# Patient Record
Sex: FEMALE | Race: WHITE | Employment: PART TIME | URBAN - METROPOLITAN AREA
[De-identification: names, ages, dates, MRNs, and addresses within clinical notes are randomized per-mention and may not be internally consistent; named-entity substitution may affect disease eponyms.]

---

## 2019-02-12 ENCOUNTER — HOSPITAL ENCOUNTER (EMERGENCY)
Age: 24
Discharge: HOME | End: 2019-02-12
Attending: EMERGENCY MEDICINE
Payer: COMMERCIAL

## 2019-02-12 VITALS
WEIGHT: 230 LBS | HEART RATE: 93 BPM | RESPIRATION RATE: 16 BRPM | TEMPERATURE: 98.7 F | OXYGEN SATURATION: 98 % | HEIGHT: 63 IN | BODY MASS INDEX: 40.75 KG/M2 | DIASTOLIC BLOOD PRESSURE: 88 MMHG | SYSTOLIC BLOOD PRESSURE: 152 MMHG

## 2019-02-12 DIAGNOSIS — J06.9 UPPER RESPIRATORY TRACT INFECTION, UNSPECIFIED TYPE: Primary | ICD-10-CM

## 2019-02-12 DIAGNOSIS — J01.80 ACUTE NON-RECURRENT SINUSITIS OF OTHER SINUS: ICD-10-CM

## 2019-02-12 PROCEDURE — 99282 EMERGENCY DEPT VISIT SF MDM: CPT

## 2019-02-12 RX ORDER — AMOXICILLIN 500 MG/1
500 CAPSULE ORAL 3 TIMES DAILY
Qty: 30 CAPSULE | Refills: 0 | Status: SHIPPED | OUTPATIENT
Start: 2019-02-12 | End: 2019-02-22

## 2019-02-12 RX ORDER — LORATADINE 10 MG/1
10 TABLET ORAL DAILY
Qty: 10 TABLET | Refills: 0 | Status: ON HOLD | OUTPATIENT
Start: 2019-02-12 | End: 2022-10-25 | Stop reason: HOSPADM

## 2019-02-12 RX ORDER — FLUTICASONE PROPIONATE 50 MCG
1 SPRAY, SUSPENSION (ML) NASAL DAILY
Qty: 1 BOTTLE | Refills: 0 | Status: SHIPPED | OUTPATIENT
Start: 2019-02-12 | End: 2020-02-07

## 2019-02-12 RX ORDER — HYDROCODONE POLISTIREX AND CHLORPHENIRAMINE POLISTIREX 10; 8 MG/5ML; MG/5ML
5 SUSPENSION, EXTENDED RELEASE ORAL EVERY 12 HOURS PRN
Qty: 50 ML | Refills: 0 | Status: SHIPPED | OUTPATIENT
Start: 2019-02-12 | End: 2019-02-15

## 2019-02-12 ASSESSMENT — PAIN DESCRIPTION - DESCRIPTORS: DESCRIPTORS: SORE

## 2019-02-12 ASSESSMENT — PAIN DESCRIPTION - LOCATION: LOCATION: THROAT

## 2019-02-12 ASSESSMENT — ENCOUNTER SYMPTOMS
EYES NEGATIVE: 1
ALLERGIC/IMMUNOLOGIC NEGATIVE: 1
SORE THROAT: 1
GASTROINTESTINAL NEGATIVE: 1
COUGH: 1

## 2019-02-12 ASSESSMENT — PAIN DESCRIPTION - ONSET: ONSET: ON-GOING

## 2019-02-12 ASSESSMENT — PAIN DESCRIPTION - PROGRESSION: CLINICAL_PROGRESSION: GRADUALLY IMPROVING

## 2019-02-12 ASSESSMENT — PAIN DESCRIPTION - PAIN TYPE: TYPE: ACUTE PAIN

## 2019-02-12 ASSESSMENT — PAIN SCALES - GENERAL: PAINLEVEL_OUTOF10: 1

## 2019-02-12 ASSESSMENT — PAIN - FUNCTIONAL ASSESSMENT: PAIN_FUNCTIONAL_ASSESSMENT: ACTIVITIES ARE NOT PREVENTED

## 2022-10-22 ENCOUNTER — APPOINTMENT (OUTPATIENT)
Dept: CT IMAGING | Age: 27
DRG: 263 | End: 2022-10-22
Payer: COMMERCIAL

## 2022-10-22 ENCOUNTER — HOSPITAL ENCOUNTER (INPATIENT)
Age: 27
LOS: 3 days | Discharge: HOME | DRG: 263 | End: 2022-10-25
Attending: STUDENT IN AN ORGANIZED HEALTH CARE EDUCATION/TRAINING PROGRAM | Admitting: SURGERY
Payer: COMMERCIAL

## 2022-10-22 DIAGNOSIS — K80.44 CHOLEDOCHOLITHIASIS WITH CHRONIC CHOLECYSTITIS: ICD-10-CM

## 2022-10-22 DIAGNOSIS — Z90.49 S/P LAPAROSCOPIC CHOLECYSTECTOMY: ICD-10-CM

## 2022-10-22 DIAGNOSIS — K80.21 CALCULUS OF GALLBLADDER WITH BILIARY OBSTRUCTION BUT WITHOUT CHOLECYSTITIS: Primary | ICD-10-CM

## 2022-10-22 DIAGNOSIS — R74.8 ELEVATED LIVER ENZYMES: ICD-10-CM

## 2022-10-22 DIAGNOSIS — G89.18 ACUTE POST-OPERATIVE PAIN: ICD-10-CM

## 2022-10-22 PROBLEM — K80.20 GALLSTONE (IMPACTED): Status: ACTIVE | Noted: 2022-10-22

## 2022-10-22 PROBLEM — K80.50 BILIARY COLIC: Status: ACTIVE | Noted: 2022-10-22

## 2022-10-22 LAB
ALBUMIN SERPL-MCNC: 3.8 G/DL (ref 3.5–4.6)
ALP BLD-CCNC: 454 U/L (ref 40–130)
ALT SERPL-CCNC: 565 U/L (ref 0–33)
ANION GAP SERPL CALCULATED.3IONS-SCNC: 12 MEQ/L (ref 9–15)
AST SERPL-CCNC: 601 U/L (ref 0–35)
BACTERIA: ABNORMAL /HPF
BASOPHILS ABSOLUTE: 0.1 K/UL (ref 0–0.2)
BASOPHILS RELATIVE PERCENT: 0.7 %
BILIRUB SERPL-MCNC: 1.7 MG/DL (ref 0.2–0.7)
BILIRUBIN URINE: ABNORMAL
BLOOD, URINE: NEGATIVE
BUN BLDV-MCNC: 10 MG/DL (ref 6–20)
C-REACTIVE PROTEIN: 12.6 MG/L (ref 0–5)
CALCIUM SERPL-MCNC: 9.1 MG/DL (ref 8.5–9.9)
CHLORIDE BLD-SCNC: 101 MEQ/L (ref 95–107)
CLARITY: CLEAR
CO2: 23 MEQ/L (ref 20–31)
COLOR: ABNORMAL
CREAT SERPL-MCNC: 0.53 MG/DL (ref 0.5–0.9)
EOSINOPHILS ABSOLUTE: 0.1 K/UL (ref 0–0.7)
EOSINOPHILS RELATIVE PERCENT: 0.6 %
EPITHELIAL CELLS, UA: ABNORMAL /HPF (ref 0–5)
GFR SERPL CREATININE-BSD FRML MDRD: >60 ML/MIN/{1.73_M2}
GLOBULIN: 3.3 G/DL (ref 2.3–3.5)
GLUCOSE BLD-MCNC: 105 MG/DL (ref 70–99)
GLUCOSE URINE: NEGATIVE MG/DL
HCG, URINE, POC: NEGATIVE
HCT VFR BLD CALC: 38.8 % (ref 37–47)
HEMOGLOBIN: 13.3 G/DL (ref 12–16)
HYALINE CASTS: ABNORMAL /HPF (ref 0–5)
KETONES, URINE: NEGATIVE MG/DL
LEUKOCYTE ESTERASE, URINE: ABNORMAL
LIPASE: 34 U/L (ref 12–95)
LYMPHOCYTES ABSOLUTE: 1.4 K/UL (ref 1–4.8)
LYMPHOCYTES RELATIVE PERCENT: 13.2 %
Lab: NORMAL
MCH RBC QN AUTO: 27.6 PG (ref 27–31.3)
MCHC RBC AUTO-ENTMCNC: 34.4 % (ref 33–37)
MCV RBC AUTO: 80.4 FL (ref 79.4–94.8)
MONOCYTES ABSOLUTE: 0.6 K/UL (ref 0.2–0.8)
MONOCYTES RELATIVE PERCENT: 6.2 %
NEGATIVE QC PASS/FAIL: NORMAL
NEUTROPHILS ABSOLUTE: 8.3 K/UL (ref 1.4–6.5)
NEUTROPHILS RELATIVE PERCENT: 79.3 %
NITRITE, URINE: NEGATIVE
PDW BLD-RTO: 15.2 % (ref 11.5–14.5)
PH UA: 5.5 (ref 5–9)
PLATELET # BLD: 306 K/UL (ref 130–400)
POSITIVE QC PASS/FAIL: NORMAL
POTASSIUM SERPL-SCNC: 3.8 MEQ/L (ref 3.4–4.9)
PROTEIN UA: 100 MG/DL
RBC # BLD: 4.83 M/UL (ref 4.2–5.4)
RBC UA: ABNORMAL /HPF (ref 0–5)
REASON FOR REJECTION: NORMAL
REJECTED TEST: NORMAL
SODIUM BLD-SCNC: 136 MEQ/L (ref 135–144)
SPECIFIC GRAVITY UA: 1.02 (ref 1–1.03)
TOTAL PROTEIN: 7.1 G/DL (ref 6.3–8)
URINE REFLEX TO CULTURE: YES
UROBILINOGEN, URINE: 0.2 E.U./DL
WBC # BLD: 10.5 K/UL (ref 4.8–10.8)
WBC UA: ABNORMAL /HPF (ref 0–5)

## 2022-10-22 PROCEDURE — 74176 CT ABD & PELVIS W/O CONTRAST: CPT

## 2022-10-22 PROCEDURE — 1210000000 HC MED SURG R&B

## 2022-10-22 PROCEDURE — 6360000002 HC RX W HCPCS: Performed by: STUDENT IN AN ORGANIZED HEALTH CARE EDUCATION/TRAINING PROGRAM

## 2022-10-22 PROCEDURE — 99285 EMERGENCY DEPT VISIT HI MDM: CPT

## 2022-10-22 PROCEDURE — 6370000000 HC RX 637 (ALT 250 FOR IP): Performed by: SURGERY

## 2022-10-22 PROCEDURE — 87086 URINE CULTURE/COLONY COUNT: CPT

## 2022-10-22 PROCEDURE — 80053 COMPREHEN METABOLIC PANEL: CPT

## 2022-10-22 PROCEDURE — 81001 URINALYSIS AUTO W/SCOPE: CPT

## 2022-10-22 PROCEDURE — 99223 1ST HOSP IP/OBS HIGH 75: CPT | Performed by: SURGERY

## 2022-10-22 PROCEDURE — 85025 COMPLETE CBC W/AUTO DIFF WBC: CPT

## 2022-10-22 PROCEDURE — 2580000003 HC RX 258: Performed by: SURGERY

## 2022-10-22 PROCEDURE — 86140 C-REACTIVE PROTEIN: CPT

## 2022-10-22 PROCEDURE — 96375 TX/PRO/DX INJ NEW DRUG ADDON: CPT

## 2022-10-22 PROCEDURE — 96365 THER/PROPH/DIAG IV INF INIT: CPT

## 2022-10-22 PROCEDURE — 36415 COLL VENOUS BLD VENIPUNCTURE: CPT

## 2022-10-22 PROCEDURE — 83690 ASSAY OF LIPASE: CPT

## 2022-10-22 PROCEDURE — 2580000003 HC RX 258: Performed by: STUDENT IN AN ORGANIZED HEALTH CARE EDUCATION/TRAINING PROGRAM

## 2022-10-22 RX ORDER — ONDANSETRON 2 MG/ML
4 INJECTION INTRAMUSCULAR; INTRAVENOUS
Status: COMPLETED | OUTPATIENT
Start: 2022-10-22 | End: 2022-10-22

## 2022-10-22 RX ORDER — POLYETHYLENE GLYCOL 3350 17 G/17G
17 POWDER, FOR SOLUTION ORAL DAILY
Status: DISCONTINUED | OUTPATIENT
Start: 2022-10-23 | End: 2022-10-23

## 2022-10-22 RX ORDER — ACETAMINOPHEN 325 MG/1
650 TABLET ORAL EVERY 4 HOURS PRN
Status: DISCONTINUED | OUTPATIENT
Start: 2022-10-22 | End: 2022-10-24

## 2022-10-22 RX ORDER — SODIUM CHLORIDE 9 MG/ML
INJECTION, SOLUTION INTRAVENOUS PRN
Status: DISCONTINUED | OUTPATIENT
Start: 2022-10-22 | End: 2022-10-22 | Stop reason: SDUPTHER

## 2022-10-22 RX ORDER — BISACODYL 10 MG
10 SUPPOSITORY, RECTAL RECTAL DAILY PRN
Status: DISCONTINUED | OUTPATIENT
Start: 2022-10-22 | End: 2022-10-25 | Stop reason: HOSPADM

## 2022-10-22 RX ORDER — ENOXAPARIN SODIUM 100 MG/ML
30 INJECTION SUBCUTANEOUS 2 TIMES DAILY
Status: DISCONTINUED | OUTPATIENT
Start: 2022-10-22 | End: 2022-10-24

## 2022-10-22 RX ORDER — ONDANSETRON 2 MG/ML
4 INJECTION INTRAMUSCULAR; INTRAVENOUS EVERY 6 HOURS PRN
Status: DISCONTINUED | OUTPATIENT
Start: 2022-10-22 | End: 2022-10-25 | Stop reason: HOSPADM

## 2022-10-22 RX ORDER — SODIUM CHLORIDE, SODIUM LACTATE, POTASSIUM CHLORIDE, CALCIUM CHLORIDE 600; 310; 30; 20 MG/100ML; MG/100ML; MG/100ML; MG/100ML
INJECTION, SOLUTION INTRAVENOUS CONTINUOUS
Status: DISCONTINUED | OUTPATIENT
Start: 2022-10-22 | End: 2022-10-23

## 2022-10-22 RX ORDER — SODIUM CHLORIDE 0.9 % (FLUSH) 0.9 %
5-40 SYRINGE (ML) INJECTION PRN
Status: DISCONTINUED | OUTPATIENT
Start: 2022-10-22 | End: 2022-10-22 | Stop reason: SDUPTHER

## 2022-10-22 RX ORDER — SODIUM CHLORIDE 0.9 % (FLUSH) 0.9 %
5-40 SYRINGE (ML) INJECTION EVERY 12 HOURS SCHEDULED
Status: DISCONTINUED | OUTPATIENT
Start: 2022-10-22 | End: 2022-10-25 | Stop reason: HOSPADM

## 2022-10-22 RX ORDER — FENTANYL CITRATE 50 UG/ML
25 INJECTION, SOLUTION INTRAMUSCULAR; INTRAVENOUS
Status: DISCONTINUED | OUTPATIENT
Start: 2022-10-22 | End: 2022-10-23

## 2022-10-22 RX ORDER — SODIUM CHLORIDE 9 MG/ML
INJECTION, SOLUTION INTRAVENOUS PRN
Status: DISCONTINUED | OUTPATIENT
Start: 2022-10-22 | End: 2022-10-25 | Stop reason: HOSPADM

## 2022-10-22 RX ORDER — ONDANSETRON 4 MG/1
4 TABLET, ORALLY DISINTEGRATING ORAL EVERY 8 HOURS PRN
Status: DISCONTINUED | OUTPATIENT
Start: 2022-10-22 | End: 2022-10-25 | Stop reason: HOSPADM

## 2022-10-22 RX ORDER — SODIUM CHLORIDE 0.9 % (FLUSH) 0.9 %
5-40 SYRINGE (ML) INJECTION EVERY 12 HOURS SCHEDULED
Status: DISCONTINUED | OUTPATIENT
Start: 2022-10-22 | End: 2022-10-22 | Stop reason: SDUPTHER

## 2022-10-22 RX ORDER — KETOROLAC TROMETHAMINE 30 MG/ML
30 INJECTION, SOLUTION INTRAMUSCULAR; INTRAVENOUS ONCE
Status: COMPLETED | OUTPATIENT
Start: 2022-10-22 | End: 2022-10-22

## 2022-10-22 RX ORDER — SODIUM CHLORIDE 0.9 % (FLUSH) 0.9 %
5-40 SYRINGE (ML) INJECTION PRN
Status: DISCONTINUED | OUTPATIENT
Start: 2022-10-22 | End: 2022-10-25 | Stop reason: HOSPADM

## 2022-10-22 RX ORDER — FENTANYL CITRATE 50 UG/ML
50 INJECTION, SOLUTION INTRAMUSCULAR; INTRAVENOUS
Status: DISCONTINUED | OUTPATIENT
Start: 2022-10-22 | End: 2022-10-23

## 2022-10-22 RX ADMIN — KETOROLAC TROMETHAMINE 30 MG: 30 INJECTION, SOLUTION INTRAMUSCULAR at 14:50

## 2022-10-22 RX ADMIN — ACETAMINOPHEN 650 MG: 325 TABLET ORAL at 20:43

## 2022-10-22 RX ADMIN — ONDANSETRON 4 MG: 2 INJECTION INTRAMUSCULAR; INTRAVENOUS at 14:50

## 2022-10-22 RX ADMIN — SODIUM CHLORIDE, PRESERVATIVE FREE 10 ML: 5 INJECTION INTRAVENOUS at 20:44

## 2022-10-22 RX ADMIN — PIPERACILLIN AND TAZOBACTAM 3375 MG: 3; .375 INJECTION, POWDER, FOR SOLUTION INTRAVENOUS at 17:36

## 2022-10-22 ASSESSMENT — ENCOUNTER SYMPTOMS
COUGH: 0
TROUBLE SWALLOWING: 0
DIARRHEA: 0
SINUS PRESSURE: 0
SHORTNESS OF BREATH: 0
ABDOMINAL PAIN: 1
VOMITING: 0
BACK PAIN: 0
NAUSEA: 1
CHEST TIGHTNESS: 0

## 2022-10-22 ASSESSMENT — PAIN DESCRIPTION - LOCATION
LOCATION: ABDOMEN
LOCATION: HEAD
LOCATION: ABDOMEN
LOCATION: ABDOMEN

## 2022-10-22 ASSESSMENT — PAIN DESCRIPTION - ORIENTATION
ORIENTATION: RIGHT;UPPER

## 2022-10-22 ASSESSMENT — PAIN DESCRIPTION - DESCRIPTORS
DESCRIPTORS: SQUEEZING
DESCRIPTORS: ACHING

## 2022-10-22 ASSESSMENT — PAIN SCALES - GENERAL
PAINLEVEL_OUTOF10: 5
PAINLEVEL_OUTOF10: 7
PAINLEVEL_OUTOF10: 2
PAINLEVEL_OUTOF10: 6

## 2022-10-22 ASSESSMENT — PAIN DESCRIPTION - PAIN TYPE: TYPE: ACUTE PAIN

## 2022-10-22 ASSESSMENT — PAIN DESCRIPTION - FREQUENCY: FREQUENCY: INTERMITTENT

## 2022-10-22 ASSESSMENT — PAIN - FUNCTIONAL ASSESSMENT: PAIN_FUNCTIONAL_ASSESSMENT: 0-10

## 2022-10-22 NOTE — ED PROVIDER NOTES
[facility] [facility] ED  eMERGENCY dEPARTMENT eNCOUnter      Pt Name: Aminah Graham  MRN: [id number]  [place] 1995  Date of evaluation: 10/22/2022  Provider: Nini Cardenas       Chief Complaint   Patient presents with    Abdominal Pain     Abd pain x 3 days pt has a hx gallstone. Pain is in the upper right quadrant. HISTORY OF PRESENT ILLNESS   (Location/Symptom, Timing/Onset,Context/Setting, Quality, Duration, Modifying Factors, Severity)  Note limiting factors. Aminah Graham is a 32 y.o. female who presents to the emergency department with 3 days of epigastric and right upper quadrant abdominal pain. History of gallstones in the past.  Patient had eaten some cookies,\"thin mints\" prior to arrival.  Patient has nausea but no vomiting. No diarrhea. Eating and drinking makes the pain worse. Patient states she did eat much yesterday because of the pain. Patient denies any fever, chills, cough. Patient states pain is sharp in quality. Patient denies any modifying factors making the symptoms better. The history is provided by the patient and the spouse. NursingNotes were reviewed. REVIEW OF SYSTEMS    (2-9 systems for level 4, 10 or more for level 5)     Review of Systems   Constitutional:  Negative for activity change, appetite change, chills, fever and unexpected weight change. HENT:  Negative for drooling, ear pain, nosebleeds, sinus pressure and trouble swallowing. Respiratory:  Negative for cough, chest tightness and shortness of breath. Cardiovascular:  Negative for chest pain and leg swelling. Gastrointestinal:  Positive for abdominal pain and nausea. Negative for diarrhea and vomiting. Endocrine: Negative for polydipsia and polyphagia. Genitourinary:  Negative for dysuria, flank pain and frequency. Musculoskeletal:  Negative for back pain and myalgias. Skin:  Negative for pallor and rash.    Neurological:  Negative for syncope, weakness and headaches. Hematological:  Does not bruise/bleed easily. All other systems reviewed and are negative. Except as noted above the remainder of the review of systems was reviewed and negative. PAST MEDICAL HISTORY   No past medical history on file. SURGICALHISTORY     No past surgical history on file. CURRENT MEDICATIONS       Previous Medications    FLUTICASONE (FLONASE) 50 MCG/ACT NASAL SPRAY    1 spray by Nasal route daily    LORATADINE (CLARITIN) 10 MG TABLET    Take 1 tablet by mouth daily       ALLERGIES     Patient has no known allergies. FAMILY HISTORY     No family history on file. SOCIAL HISTORY       Social History     Socioeconomic History    Marital status: Single   Tobacco Use    Smoking status: Passive Smoke Exposure - Never Smoker    Smokeless tobacco: Never   Substance and Sexual Activity    Alcohol use: No    Drug use: No    Sexual activity: Yes     Partners: Male       SCREENINGS    [place] Coma Scale  Eye Opening: Spontaneous  Best Verbal Response: Oriented  Best Motor Response: Obeys commands  Jasmin Coma Scale Score: 15 @FLOW([id number])@      PHYSICAL EXAM    (up to 7 for level 4, 8 or more for level 5)     ED Triage Vitals [10/22/22 1351]   BP Temp Temp Source Heart Rate Resp SpO2 Height Weight   (!) 145/89 98.1 °F (36.7 °C) Oral 99 18 99 % 5' 3\" (1.6 m) 220 lb (99.8 kg)       Physical Exam  Vitals and nursing note reviewed. Constitutional:       General: She is awake. She is in acute distress. Appearance: Normal appearance. She is well-developed. She is obese. She is not ill-appearing, toxic-appearing or diaphoretic. Comments: No photophobia. No phonophobia. HENT:      Head: Normocephalic and atraumatic. No Maxwell's sign. Right Ear: External ear normal.      Left Ear: External ear normal.      Nose: Nose normal. No congestion or rhinorrhea.       Mouth/Throat:      Mouth: Mucous membranes are moist.      Pharynx: Oropharynx is clear. No oropharyngeal exudate or posterior oropharyngeal erythema. Eyes:      General: No scleral icterus. Right eye: No foreign body or discharge. Left eye: No discharge. Extraocular Movements: Extraocular movements intact. Conjunctiva/sclera: Conjunctivae normal.      Left eye: No exudate. Pupils: Pupils are equal, round, and reactive to light. Neck:      Vascular: No JVD. Trachea: No tracheal deviation. Comments: No meningismus. Cardiovascular:      Rate and Rhythm: Normal rate and regular rhythm. Pulses: Normal pulses. Heart sounds: Normal heart sounds. Heart sounds not distant. No murmur heard. No friction rub. No gallop. Pulmonary:      Effort: Pulmonary effort is normal. No respiratory distress. Breath sounds: Normal breath sounds. No stridor. No wheezing, rhonchi or rales. Chest:      Chest wall: No tenderness. Abdominal:      General: Abdomen is flat. Bowel sounds are normal. There is no distension or abdominal bruit. There are no signs of injury. Palpations: Abdomen is soft. There is no shifting dullness, fluid wave, hepatomegaly, splenomegaly, mass or pulsatile mass. Tenderness: There is abdominal tenderness in the right upper quadrant and epigastric area. There is guarding. There is no right CVA tenderness, left CVA tenderness or rebound. Positive signs include Hill's sign. Negative signs include Rovsing's sign and McBurney's sign. Hernia: No hernia is present. Musculoskeletal:         General: No swelling, tenderness, deformity or signs of injury. Normal range of motion. Cervical back: Normal range of motion and neck supple. No rigidity. Lymphadenopathy:      Head:      Right side of head: No submental adenopathy. Left side of head: No submental adenopathy. Skin:     General: Skin is warm and dry. Capillary Refill: Capillary refill takes less than 2 seconds.       Coloration: Skin is not jaundiced or pale. Findings: No bruising, erythema, lesion or rash. Neurological:      General: No focal deficit present. Mental Status: She is alert and oriented to person, place, and time. Mental status is at baseline. Cranial Nerves: No cranial nerve deficit. Sensory: No sensory deficit. Motor: No weakness. Coordination: Coordination normal.      Deep Tendon Reflexes: Reflexes are normal and symmetric. Psychiatric:         Mood and Affect: Mood normal.         Behavior: Behavior normal. Behavior is cooperative. Thought Content: Thought content normal.         Judgment: Judgment normal.       DIAGNOSTIC RESULTS     EKG: All EKG's are interpreted by the Emergency Department Physician who either signs or Co-signsthis chart in the absence of a cardiologist.        RADIOLOGY:   Adeola Heading such as CT, Ultrasound and MRI are read by the radiologist. Plain radiographic images are visualized and preliminarily interpreted by the emergency physician with the below findings:        Interpretation per the Radiologist below, if available at the time ofthis note:    CT ABDOMEN PELVIS WO CONTRAST Additional Contrast? None   Final Result   Distended gallbladder with layering stones in the fundus but no evidence of   significant thickening of the gallbladder wall, no evidence of   pericholecystic stranding, no secondary signs of acute cholecystitis. Would   recommend clinical correlation. Scattered peritoneal lymph nodes visualized. The appendix is visualized and is unremarkable. No evidence of renal stones or obstructive uropathy.                ED BEDSIDE ULTRASOUND:   Performed by ED Physician - none    LABS:  Labs Reviewed   CBC WITH AUTO DIFFERENTIAL - Abnormal; Notable for the following components:       Result Value    RDW 15.2 (*)     Neutrophils Absolute 8.3 (*)     All other components within normal limits   C-REACTIVE PROTEIN - Abnormal; Notable for the following components:    CRP 12.6 (*)     All other components within normal limits   URINALYSIS WITH REFLEX TO CULTURE - Abnormal; Notable for the following components:    Color, UA DARK YELLOW (*)     Bilirubin Urine SMALL (*)     Protein,  (*)     Leukocyte Esterase, Urine SMALL (*)     All other components within normal limits   MICROSCOPIC URINALYSIS - Abnormal; Notable for the following components:    Bacteria, UA RARE (*)     WBC, UA 10-20 (*)     RBC, UA 3-5 (*)     All other components within normal limits   COMPREHENSIVE METABOLIC PANEL - Abnormal; Notable for the following components:    Glucose 105 (*)     Total Bilirubin 1.7 (*)     Alkaline Phosphatase 454 (*)      (*)      (*)     All other components within normal limits   CULTURE, URINE   SPECIMEN REJECTION   LIPASE   POC PREGNANCY UR-QUAL       All other labs were within normal range or not returned as of this dictation. EMERGENCY DEPARTMENT COURSE and DIFFERENTIAL DIAGNOSIS/MDM:   Vitals:    Vitals:    10/22/22 1351 10/22/22 1630 10/22/22 1700   BP: (!) 145/89 113/76 108/72   Pulse: 99 98 96   Resp: 18 18 16   Temp: 98.1 °F (36.7 °C)     TempSrc: Oral     SpO2: 99% 100% 100%   Weight: 220 lb (99.8 kg)     Height: 5' 3\" (1.6 m)             MDM  Patient has elevated liver enzymes, elevated bilirubin, elevated alk phos, elevated AST and ALT. Surgery consult was obtained with Dr. Adi Lloyd every 6 hours and ED attending to place transitional orders. On reexamination the findings were discussed with the patient. ED attending explained the plan is for surgery tomorrow. Patient n.p.o.            CONSULTS:  None    PROCEDURES:  Unless otherwise noted below, none     Procedures    FINAL IMPRESSION      1. Calculus of gallbladder with biliary obstruction but without cholecystitis    2.  Elevated liver enzymes          DISPOSITION/PLAN   DISPOSITION Decision To Admit 10/22/2022 04:52:41 PM      PATIENT REFERRED TO:  No follow-up provider specified.     DISCHARGE MEDICATIONS:  New Prescriptions    No medications on file          (Please note that portions of this note were completed with a voice recognition program.  Efforts were made to edit the dictations but occasionally words are mis-transcribed.)    Stoney Puente DO (electronically signed)  Attending Emergency Physician          Stoney Puente DO  10/22/22 [facility],   10/22/22 [id number]

## 2022-10-23 ENCOUNTER — ANESTHESIA (OUTPATIENT)
Dept: OPERATING ROOM | Age: 27
DRG: 263 | End: 2022-10-23
Payer: COMMERCIAL

## 2022-10-23 PROBLEM — K80.44 CHOLEDOCHOLITHIASIS WITH CHRONIC CHOLECYSTITIS: Status: ACTIVE | Noted: 2022-10-22

## 2022-10-23 LAB
ABO/RH: NORMAL
ALBUMIN SERPL-MCNC: 3.7 G/DL (ref 3.5–4.6)
ALP BLD-CCNC: 462 U/L (ref 40–130)
ALT SERPL-CCNC: 406 U/L (ref 0–33)
ANION GAP SERPL CALCULATED.3IONS-SCNC: 11 MEQ/L (ref 9–15)
ANTIBODY SCREEN: NORMAL
AST SERPL-CCNC: 257 U/L (ref 0–35)
BASOPHILS ABSOLUTE: 0.1 K/UL (ref 0–0.2)
BASOPHILS RELATIVE PERCENT: 0.5 %
BILIRUB SERPL-MCNC: 0.8 MG/DL (ref 0.2–0.7)
BILIRUBIN DIRECT: 0.3 MG/DL (ref 0–0.4)
BUN BLDV-MCNC: 9 MG/DL (ref 6–20)
CALCIUM SERPL-MCNC: 8.5 MG/DL (ref 8.5–9.9)
CHLORIDE BLD-SCNC: 101 MEQ/L (ref 95–107)
CO2: 25 MEQ/L (ref 20–31)
CREAT SERPL-MCNC: 0.62 MG/DL (ref 0.5–0.9)
EOSINOPHILS ABSOLUTE: 0 K/UL (ref 0–0.7)
EOSINOPHILS RELATIVE PERCENT: 0.3 %
GFR SERPL CREATININE-BSD FRML MDRD: >60 ML/MIN/{1.73_M2}
GLOBULIN: 2.8 G/DL (ref 2.3–3.5)
GLUCOSE BLD-MCNC: 84 MG/DL (ref 70–99)
HAV IGM SER IA-ACNC: NONREACTIVE
HCT VFR BLD CALC: 35.9 % (ref 37–47)
HEMOGLOBIN: 11.9 G/DL (ref 12–16)
HEPATITIS B CORE IGM ANTIBODY: NONREACTIVE
HEPATITIS B SURFACE ANTIGEN: NONREACTIVE
HEPATITIS C ANTIBODY: NONREACTIVE
INR BLD: 1.1
LYMPHOCYTES ABSOLUTE: 1.8 K/UL (ref 1–4.8)
LYMPHOCYTES RELATIVE PERCENT: 15.2 %
MCH RBC QN AUTO: 26.6 PG (ref 27–31.3)
MCHC RBC AUTO-ENTMCNC: 33.1 % (ref 33–37)
MCV RBC AUTO: 80.4 FL (ref 79.4–94.8)
MONOCYTES ABSOLUTE: 0.8 K/UL (ref 0.2–0.8)
MONOCYTES RELATIVE PERCENT: 6.8 %
NEUTROPHILS ABSOLUTE: 9.3 K/UL (ref 1.4–6.5)
NEUTROPHILS RELATIVE PERCENT: 77.2 %
PDW BLD-RTO: 15.3 % (ref 11.5–14.5)
PLATELET # BLD: 298 K/UL (ref 130–400)
POTASSIUM REFLEX MAGNESIUM: 3.6 MEQ/L (ref 3.4–4.9)
PROTHROMBIN TIME: 14.1 SEC (ref 12.3–14.9)
RBC # BLD: 4.46 M/UL (ref 4.2–5.4)
SODIUM BLD-SCNC: 137 MEQ/L (ref 135–144)
TOTAL PROTEIN: 6.5 G/DL (ref 6.3–8)
URINE CULTURE, ROUTINE: NORMAL
WBC # BLD: 12.1 K/UL (ref 4.8–10.8)

## 2022-10-23 PROCEDURE — 2580000003 HC RX 258: Performed by: SURGERY

## 2022-10-23 PROCEDURE — 99232 SBSQ HOSP IP/OBS MODERATE 35: CPT | Performed by: PHYSICIAN ASSISTANT

## 2022-10-23 PROCEDURE — 1210000000 HC MED SURG R&B

## 2022-10-23 PROCEDURE — 86900 BLOOD TYPING SEROLOGIC ABO: CPT

## 2022-10-23 PROCEDURE — 85025 COMPLETE CBC W/AUTO DIFF WBC: CPT

## 2022-10-23 PROCEDURE — 6360000002 HC RX W HCPCS: Performed by: SURGERY

## 2022-10-23 PROCEDURE — 82248 BILIRUBIN DIRECT: CPT

## 2022-10-23 PROCEDURE — 86901 BLOOD TYPING SEROLOGIC RH(D): CPT

## 2022-10-23 PROCEDURE — 80074 ACUTE HEPATITIS PANEL: CPT

## 2022-10-23 PROCEDURE — 85610 PROTHROMBIN TIME: CPT

## 2022-10-23 PROCEDURE — 80053 COMPREHEN METABOLIC PANEL: CPT

## 2022-10-23 PROCEDURE — 36415 COLL VENOUS BLD VENIPUNCTURE: CPT

## 2022-10-23 PROCEDURE — 86850 RBC ANTIBODY SCREEN: CPT

## 2022-10-23 PROCEDURE — 2580000003 HC RX 258: Performed by: PHYSICIAN ASSISTANT

## 2022-10-23 PROCEDURE — 6370000000 HC RX 637 (ALT 250 FOR IP): Performed by: SURGERY

## 2022-10-23 RX ORDER — SODIUM CHLORIDE, SODIUM LACTATE, POTASSIUM CHLORIDE, CALCIUM CHLORIDE 600; 310; 30; 20 MG/100ML; MG/100ML; MG/100ML; MG/100ML
INJECTION, SOLUTION INTRAVENOUS CONTINUOUS
Status: DISCONTINUED | OUTPATIENT
Start: 2022-10-24 | End: 2022-10-24

## 2022-10-23 RX ORDER — SENNA AND DOCUSATE SODIUM 50; 8.6 MG/1; MG/1
2 TABLET, FILM COATED ORAL DAILY PRN
Status: DISCONTINUED | OUTPATIENT
Start: 2022-10-23 | End: 2022-10-24

## 2022-10-23 RX ADMIN — PIPERACILLIN AND TAZOBACTAM 3375 MG: 3; .375 INJECTION, POWDER, FOR SOLUTION INTRAVENOUS at 08:12

## 2022-10-23 RX ADMIN — ACETAMINOPHEN 650 MG: 325 TABLET ORAL at 12:09

## 2022-10-23 RX ADMIN — PIPERACILLIN AND TAZOBACTAM 3375 MG: 3; .375 INJECTION, POWDER, FOR SOLUTION INTRAVENOUS at 16:22

## 2022-10-23 RX ADMIN — HYDROMORPHONE HYDROCHLORIDE 0.5 MG: 1 INJECTION, SOLUTION INTRAMUSCULAR; INTRAVENOUS; SUBCUTANEOUS at 01:46

## 2022-10-23 RX ADMIN — PIPERACILLIN AND TAZOBACTAM 3375 MG: 3; .375 INJECTION, POWDER, FOR SOLUTION INTRAVENOUS at 01:49

## 2022-10-23 RX ADMIN — Medication 10 ML: at 23:06

## 2022-10-23 RX ADMIN — SODIUM CHLORIDE, POTASSIUM CHLORIDE, SODIUM LACTATE AND CALCIUM CHLORIDE: 600; 310; 30; 20 INJECTION, SOLUTION INTRAVENOUS at 23:37

## 2022-10-23 RX ADMIN — ENOXAPARIN SODIUM 30 MG: 100 INJECTION SUBCUTANEOUS at 08:09

## 2022-10-23 RX ADMIN — Medication 10 ML: at 01:51

## 2022-10-23 RX ADMIN — SODIUM CHLORIDE, POTASSIUM CHLORIDE, SODIUM LACTATE AND CALCIUM CHLORIDE: 600; 310; 30; 20 INJECTION, SOLUTION INTRAVENOUS at 02:24

## 2022-10-23 RX ADMIN — PIPERACILLIN AND TAZOBACTAM 3375 MG: 3; .375 INJECTION, POWDER, FOR SOLUTION INTRAVENOUS at 23:04

## 2022-10-23 ASSESSMENT — PAIN SCALES - GENERAL
PAINLEVEL_OUTOF10: 7
PAINLEVEL_OUTOF10: 3
PAINLEVEL_OUTOF10: 0

## 2022-10-23 ASSESSMENT — ENCOUNTER SYMPTOMS
ALLERGIC/IMMUNOLOGIC NEGATIVE: 1
RESPIRATORY NEGATIVE: 1
EYES NEGATIVE: 1
ABDOMINAL PAIN: 1
NAUSEA: 1

## 2022-10-23 ASSESSMENT — PAIN DESCRIPTION - LOCATION: LOCATION: ABDOMEN

## 2022-10-23 NOTE — PLAN OF CARE
Problem: Discharge Planning  Goal: Discharge to home or other facility with appropriate resources  Outcome: Progressing  Flowsheets (Taken 10/22/2022 1953)  Discharge to home or other facility with appropriate resources:   Identify barriers to discharge with patient and caregiver   Identify discharge learning needs (meds, wound care, etc)     Problem: Pain  Goal: Verbalizes/displays adequate comfort level or baseline comfort level  Outcome: Progressing

## 2022-10-23 NOTE — PROGRESS NOTES
EMERGENCY GENERAL SURGERY DAILY PROGRESS NOTE      Patient Name: Karey Souza  Admission Date 10/22/2022    Hospital Day: 1  Patient seen and examined on 10/23/2022    INTERVAL HISTORY/EVENTS     Background:  Karey Souza is a 32 y.o. female without significant PMHx. Recent pregnancy with vaginal delivery 4 months ago. No prior abdominal surgeries. Patient presents to [facility] [place] on 10/22/2022 with complaint of moderate-severe abdominal pain associated with nausea for 3 days. Patient started having episodes of biliary colic (vs passing stones) 5 months ago during pregnancy. Over the last two months, episodes last several hours to 1-2 days and occur ever 1-2 weeks. During this past week, patient had several recurring episodes of RUQ pain with nausea and vomiting. ED evaluation concerning for choledocholithiasis with distended GB (no stranding, PCF), elevated bilirubin, elevated alk phos, and elevated transaminases. Patient admitted to [facility] for management of choledocholithiasis. Hospital Course:  10/22/2022: admitted to [facility] for choledocholithiasis management. Started on IV Zosyn. NPO.      24 Hour Events:  Patient admitted overnight for choledocholithiasis. No acute events overnight. Abdominal pain has resolved, patient feels significantly better. No N/V. No BM since admission. Last BM 2 days ago, denies any change in stool color. Nothing to eat or drink for last 24hr. Vitals: afebrile. VSS on RA    Labs: leukocytosis at 12.1. H/H downtrend to 11.9/35.9 (13.3/38. 8). K+ 3.6. Renal function appropriate. T bili downtrend to 0.8 (1.7). Alk phos elevated with slight uptrend to 462 (454), ALT/AST downtrend to 406/257 (565/601)    I&O: not documented      PHYSICAL EXAM     Vitals Average, Min and Max for last 24 hours:  Temp: Temp: 99.3 °F (37.4 °C);  Temp  Av.7 °F (37.1 °C)  Min: 98.1 °F (36.7 °C)  Max: 99.3 °F (37.4 °C)  Respirations: Resp  Av.8  Min: 16  Max: 18  Pulse: Pulse  Av Min: 96  Max: 99  Blood Pressure: Systolic (94OUC), BKA:337 , Min:107 , MDT:877   ; Diastolic (78JMU), CHB:33, Min:72, Max:89    SpO2: SpO2  Av %  Min: 98 %  Max: 100 %    24hr Intake/Output:   Intake/Output Summary (Last 24 hours) at 10/23/2022 0744  Last data filed at 10/22/2022 2044  Gross per 24 hour   Intake 10 ml   Output --   Net 10 ml       Vitals: /79   Pulse 96   Temp 99.3 °F (37.4 °C) (Oral)   Resp 17   Ht 5' 3\" (1.6 m)   Wt 220 lb (99.8 kg)   LMP 10/08/2022 (Approximate)   SpO2 99%   BMI 38.97 kg/m²     Physical Exam:  Constitutional: laying in bed comfortably. NAD  HEENT: Atraumatic normocephalic. Cardiovascular: Regular rate and rhythm. Pulmonary: Clear to ausculation bilaterally. No wheezing, rhonchi or rales. Non labored breathing on RA  Abdominal: Soft. Non-distended. Only mild tenderness to deep palpation of RUQ on inspiration. Otherwise, no TTP. No guarding/rebound TTP. Musculoskeletal: Good ROM in all extremities. No edema. Neurological: Alert, awake, and orientated x 3. Motor and sensory grossly intact. No focal deficits. GCS of 15.   Skin: warm/dry    LABORATORY RESULTS (LAST 24 HOURS)     CBC with Differential:    Lab Results   Component Value Date/Time    WBC 12.1 10/23/2022 06:02 AM    RBC 4.46 10/23/2022 06:02 AM    HGB 11.9 10/23/2022 06:02 AM    HCT 35.9 10/23/2022 06:02 AM     10/23/2022 06:02 AM    MCV 80.4 10/23/2022 06:02 AM    MCH 26.6 10/23/2022 06:02 AM    MCHC 33.1 10/23/2022 06:02 AM    RDW 15.3 10/23/2022 06:02 AM    LYMPHOPCT 15.2 10/23/2022 06:02 AM    MONOPCT 6.8 10/23/2022 06:02 AM    BASOPCT 0.5 10/23/2022 06:02 AM    MONOSABS 0.8 10/23/2022 06:02 AM    LYMPHSABS 1.8 10/23/2022 06:02 AM    EOSABS 0.0 10/23/2022 06:02 AM    BASOSABS 0.1 10/23/2022 06:02 AM     CMP:    Lab Results   Component Value Date/Time     10/22/2022 03:33 PM    K 3.8 10/22/2022 03:33 PM     10/22/2022 03:33 PM    CO2 23 10/22/2022 03:33 PM    BUN 10 10/22/2022 03:33 PM    CREATININE 0.53 10/22/2022 03:33 PM    LABGLOM >60.0 10/22/2022 03:33 PM    GLUCOSE 105 10/22/2022 03:33 PM    PROT 7.1 10/22/2022 03:33 PM    LABALBU 3.8 10/22/2022 03:33 PM    CALCIUM 9.1 10/22/2022 03:33 PM    BILITOT 1.7 10/22/2022 03:33 PM    KTZBKTR 893 10/22/2022 03:33 PM     10/22/2022 03:33 PM     10/22/2022 03:33 PM     Magnesium:  No results found for: MG  PT/INR:    Lab Results   Component Value Date/Time    PROTIME 14.1 10/23/2022 06:02 AM    INR 1.1 10/23/2022 06:02 AM     PTT:  No results found for: APTT, PTT    IMAGING RESULTS (PERSONALLY REVIEWED)     Now new imaging. ASSESSMENT & PLAN     Diagnoses:  Choledocholithiasis  Acute abdominal pain  Nausea      PMHx: significant PMHx. Recent pregnancy with vaginal delivery 4 months ago. No prior abdominal surgeries    Home meds: denies any home meds (discussed RAL, 10/23)    Incidental Findings: distended GB with gallstones (related to admission diagnosis, discussed with patient)      ASSESSMENT/PLAN:  Neurological: Acute abdominal pain  - Continue tylenol 650mg q4hr prn  - Continue dilaudid 0.5mg q3hr prn for severe pain only (has not required, but in event of recurring biliary colic/passing GB stone overnight)     Cardiovascular:   - Continue to monitor per RNF nursing protocols     Respiratory:   - Maintain O2 sats > 92%, encourage IS. GI/Diet: Choledocholithiasis without signs of cholecystitis on CT imaging. Bilirubin and LFTs downtrending this AM. Suspect patient's pain over last 5 months has being biliary colic vs passing GB stones. - Nicole Gillespie for CLD today. NPO at midnight for cholecystectomy in AM  - Change bowel regimen to senokot-s BID  - AM HFP to trend bilirubin and LFTs. If continuing to downtrend, plan for OR tomorrow for cholecystectomy. - F/u acute hepatitis panel for markedly increased LFTs     Renal/Electrolytes:   - HLIV.  Resume mIVF at 75cc/hr at midnight while NPO  - AM BMP     ID: Choledocholithiasis without signs of cholecystitis on CT imaging. Mild leukocytosis this AM at 11. 9. afebrile. Not tachycardic. Normotensive. - started on IV zosyn overnight. Will continue in perioperative setting  - AM CBC     Heme:   - AM CBC  - no indication for transfusion     Endocrine:   - no acute glycemic issues     MSK:   - Activity: OOBAT  - PT/OT: patient independently ambulatory. Assess for need post-operatively    Prophylaxis:   - SCDs, lovenox 40mg daily    Lines/Tubes/Drains:  - Maintain PIVs  - No indication for victor catheter, monitor for urinary retention    Dispo: Remain in RNF to trend LFTs/bilirubin and for likely OR tomorrow for laparoscopic cholecystectomy  Accom Status: General Status      Follow up with EGS TBD       Please call for any questions or concerns.   Yakov Epps Treatment Team Sticky Note for contact information]      Niko Kimble PA-C  Trauma/Critical Care  [see Treatment Team Sticky Note for contact information]     This patient's plan of care was discussed and made in collaboration with Trauma Attending physician, Kenji Campbell MD.

## 2022-10-23 NOTE — PROGRESS NOTES
Patient feels better today. Pain is much better than when she came in. Tolerating sips of meds and ice chips.      Electronically signed by Ludwin Clark RN on 10/23/2022 at 12:41 PM

## 2022-10-23 NOTE — PROGRESS NOTES
[facility]   Facility/Department: [facility] SURG UNIT  Speech Language Pathology    César Lerma  1995  [id number]    Date: 10/23/2022      Speech Therapy attempted to see César Lerma on this date for a/an:    Clinical Bedside Swallow Evaluation    Pt was unable to be seen due to: Other: SLP spoke with DEMETRIUS Blunt, who stated evaluation order was cancelled. Electronically signed by Iron Boggs M.A.  CCC-SLP on 10/23/22 at 9:03 AM EDT

## 2022-10-23 NOTE — H&P
EMERGENCY GENERAL SURGERY CONSULTATION    Reason for consultation:  RUQ pain/biliary colic    HPI:  Pt is 89CQ F w/a few days hx of RUQ pain. Pain is moderate to severe and constant and associated with nausea. Pain occurred after eating. She was told she had gall bladder issues in the past and has had multiple episodes of RUQ pain over the last several months. The pain has unknown inciting factors and can last from several minutes to days. Gall stones on CT and elevated LFTs. She is nearly 4mo post-partum. General surgery consulted. PMH:    History reviewed. No pertinent past medical history. PSH:    History reviewed. No pertinent surgical history. FH:    History reviewed. No pertinent family history. SH:    Social History     Tobacco Use    Smoking status: Passive Smoke Exposure - Never Smoker    Smokeless tobacco: Never   Substance Use Topics    Alcohol use: No    Drug use: No       Home Medications:  No current facility-administered medications on file prior to encounter. Current Outpatient Medications on File Prior to Encounter   Medication Sig Dispense Refill    loratadine (CLARITIN) 10 MG tablet Take 1 tablet by mouth daily (Patient not taking: Reported on 10/22/2022) 10 tablet 0    fluticasone (FLONASE) 50 MCG/ACT nasal spray 1 spray by Nasal route daily 1 Bottle 0       Review Of Systems:   Review of Systems   Constitutional:  Negative for chills and fever. HENT: Negative. Eyes: Negative. Respiratory: Negative. Cardiovascular: Negative. Gastrointestinal:  Positive for abdominal pain and nausea. Endocrine: Negative. Genitourinary: Negative. Musculoskeletal: Negative. Skin: Negative. Allergic/Immunologic: Negative. Neurological: Negative. Hematological: Negative. Psychiatric/Behavioral: Negative. PHYSICAL EXAM:    Physical Exam  Constitutional:       General: She is not in acute distress.   Eyes:      Extraocular Movements: Extraocular movements intact. Conjunctiva/sclera: Conjunctivae normal.   Cardiovascular:      Rate and Rhythm: Normal rate. Pulmonary:      Effort: Pulmonary effort is normal. No respiratory distress. Abdominal:      Palpations: Abdomen is soft. Comments: TTP in RUQ   Musculoskeletal:         General: No deformity or signs of injury. Skin:     General: Skin is warm and dry. Neurological:      General: No focal deficit present. Mental Status: She is alert.    Psychiatric:         Mood and Affect: Mood normal.           LABS:      BASIC LABS:  CBC:   Lab Results   Component Value Date/Time    WBC 10.5 10/22/2022 02:46 PM    RBC 4.83 10/22/2022 02:46 PM    HGB 13.3 10/22/2022 02:46 PM    HCT 38.8 10/22/2022 02:46 PM    MCV 80.4 10/22/2022 02:46 PM    MCH 27.6 10/22/2022 02:46 PM    MCHC 34.4 10/22/2022 02:46 PM    RDW 15.2 10/22/2022 02:46 PM     10/22/2022 02:46 PM     CBC with Differential:    Lab Results   Component Value Date/Time    WBC 10.5 10/22/2022 02:46 PM    RBC 4.83 10/22/2022 02:46 PM    HGB 13.3 10/22/2022 02:46 PM    HCT 38.8 10/22/2022 02:46 PM     10/22/2022 02:46 PM    MCV 80.4 10/22/2022 02:46 PM    MCH 27.6 10/22/2022 02:46 PM    MCHC 34.4 10/22/2022 02:46 PM    RDW 15.2 10/22/2022 02:46 PM    LYMPHOPCT 13.2 10/22/2022 02:46 PM    MONOPCT 6.2 10/22/2022 02:46 PM    BASOPCT 0.7 10/22/2022 02:46 PM    MONOSABS 0.6 10/22/2022 02:46 PM    LYMPHSABS 1.4 10/22/2022 02:46 PM    EOSABS 0.1 10/22/2022 02:46 PM    BASOSABS 0.1 10/22/2022 02:46 PM     CMP:    Lab Results   Component Value Date/Time     10/22/2022 03:33 PM    K 3.8 10/22/2022 03:33 PM     10/22/2022 03:33 PM    CO2 23 10/22/2022 03:33 PM    BUN 10 10/22/2022 03:33 PM    CREATININE 0.53 10/22/2022 03:33 PM    LABGLOM >60.0 10/22/2022 03:33 PM    GLUCOSE 105 10/22/2022 03:33 PM    PROT 7.1 10/22/2022 03:33 PM    LABALBU 3.8 10/22/2022 03:33 PM    CALCIUM 9.1 10/22/2022 03:33 PM    BILITOT 1.7 10/22/2022 03:33 PM    [facility] 647 10/22/2022 03:33 PM     10/22/2022 03:33 PM     10/22/2022 03:33 PM     BMP:    Lab Results   Component Value Date/Time     10/22/2022 03:33 PM    K 3.8 10/22/2022 03:33 PM     10/22/2022 03:33 PM    CO2 23 10/22/2022 03:33 PM    BUN 10 10/22/2022 03:33 PM    LABALBU 3.8 10/22/2022 03:33 PM    CREATININE 0.53 10/22/2022 03:33 PM    CALCIUM 9.1 10/22/2022 03:33 PM    LABGLOM >60.0 10/22/2022 03:33 PM    GLUCOSE 105 10/22/2022 03:33 PM     Magnesium:No results found for: MG  Troponin:  No results found for: TROPONINI    IMAGING:    Distended gallbladder with layering stones in the fundus but no evidence of   significant thickening of the gallbladder wall, no evidence of   pericholecystic stranding, no secondary signs of acute cholecystitis. Would   recommend clinical correlation. Scattered peritoneal lymph nodes visualized. The appendix is visualized and is unremarkable. No evidence of renal stones or obstructive uropathy. ASSESSMENT/RECOMMENDATIONS:  Pt is 25yo F w/RUQ pain/concern for biliary colic, gall stones on CT scan and elevated LFTs/bilirubin with concern for possible choledocholithiasis/cholecystitis    -admit, NPO/ivf, iv abx, trend LFTs, check hepatitis panel, if LFTs/bili still elevated then may consider further imaging (MRCP and/or RUQ ultrasound) vs possible GI consult for ERCP. Depending on clinical course, may consider cholecystectomy this admission.          Dillard Bernheim, MD

## 2022-10-23 NOTE — CARE COORDINATION
[facility] Case Management Initial Discharge Assessment    Met with Patient to discuss discharge plan. PCP: No primary care provider on file. Date of Last Visit: na    [facility] Patient: No        [facility] Notified: no    If no PCP, list provided? N/A    Discharge Planning    Living Arrangements: independently at home    Who do you live with? Annelise carias    Who helps you with your care:  self    If lives at home:     Do you have any barriers navigating in your home? no    Patient can perform ADL? Yes    Current Services (outpatient and in home) :  None    Dialysis: No    Is transportation available to get to your appointments? Yes    DME Equipment:  no    Respiratory equipment: None    Respiratory provider:  no     Pharmacy:  yes - [facility] with Medication Assistance Program?  No      Patient agreeable to [facility]? N/A    Patient agreeable to SNF/Rehab? N/A    Other discharge needs identified? N/A    Does Patient Have a High-Risk for Readmission Diagnosis (CHF, PN, MI, COPD)? No        Initial Discharge Plan? (Note: please see concurrent daily documentation for any updates after initial note). HOME WITH ANNELISE    Readmission Risk              Risk of Unplanned Readmission:  7         Electronically signed by tSu Ortez.  DEMETRIUS Dorman on 10/23/2022 at 3:19 PM

## 2022-10-24 ENCOUNTER — ANESTHESIA EVENT (OUTPATIENT)
Dept: OPERATING ROOM | Age: 27
DRG: 263 | End: 2022-10-24
Payer: COMMERCIAL

## 2022-10-24 PROBLEM — D72.829 LEUKOCYTOSIS: Status: ACTIVE | Noted: 2022-10-24

## 2022-10-24 PROBLEM — Z90.49 S/P LAPAROSCOPIC CHOLECYSTECTOMY: Status: ACTIVE | Noted: 2022-10-24

## 2022-10-24 PROBLEM — G89.18 ACUTE POST-OPERATIVE PAIN: Status: ACTIVE | Noted: 2022-10-24

## 2022-10-24 PROBLEM — R11.0 NAUSEA: Status: ACTIVE | Noted: 2022-10-24

## 2022-10-24 PROBLEM — R10.9 ACUTE ABDOMINAL PAIN: Status: ACTIVE | Noted: 2022-10-24

## 2022-10-24 LAB
ALBUMIN SERPL-MCNC: 3.3 G/DL (ref 3.5–4.6)
ALP BLD-CCNC: 347 U/L (ref 40–130)
ALT SERPL-CCNC: 229 U/L (ref 0–33)
ANION GAP SERPL CALCULATED.3IONS-SCNC: 12 MEQ/L (ref 9–15)
AST SERPL-CCNC: 69 U/L (ref 0–35)
BILIRUB SERPL-MCNC: 0.6 MG/DL (ref 0.2–0.7)
BILIRUBIN DIRECT: <0.2 MG/DL (ref 0–0.4)
BILIRUBIN, INDIRECT: ABNORMAL MG/DL (ref 0–0.6)
BUN BLDV-MCNC: 7 MG/DL (ref 6–20)
CALCIUM SERPL-MCNC: 8.6 MG/DL (ref 8.5–9.9)
CHLORIDE BLD-SCNC: 103 MEQ/L (ref 95–107)
CO2: 24 MEQ/L (ref 20–31)
CREAT SERPL-MCNC: 0.52 MG/DL (ref 0.5–0.9)
GFR SERPL CREATININE-BSD FRML MDRD: >60 ML/MIN/{1.73_M2}
GLUCOSE BLD-MCNC: 85 MG/DL (ref 70–99)
HCT VFR BLD CALC: 35.1 % (ref 37–47)
HEMOGLOBIN: 11.6 G/DL (ref 12–16)
MCH RBC QN AUTO: 26.6 PG (ref 27–31.3)
MCHC RBC AUTO-ENTMCNC: 33.1 % (ref 33–37)
MCV RBC AUTO: 80.5 FL (ref 79.4–94.8)
PDW BLD-RTO: 15.3 % (ref 11.5–14.5)
PLATELET # BLD: 324 K/UL (ref 130–400)
POTASSIUM REFLEX MAGNESIUM: 3.7 MEQ/L (ref 3.4–4.9)
RBC # BLD: 4.37 M/UL (ref 4.2–5.4)
SODIUM BLD-SCNC: 139 MEQ/L (ref 135–144)
TOTAL PROTEIN: 6.4 G/DL (ref 6.3–8)
WBC # BLD: 11 K/UL (ref 4.8–10.8)

## 2022-10-24 PROCEDURE — 2580000003 HC RX 258: Performed by: NURSE PRACTITIONER

## 2022-10-24 PROCEDURE — 88304 TISSUE EXAM BY PATHOLOGIST: CPT

## 2022-10-24 PROCEDURE — 1210000000 HC MED SURG R&B

## 2022-10-24 PROCEDURE — 7100000000 HC PACU RECOVERY - FIRST 15 MIN: Performed by: SURGERY

## 2022-10-24 PROCEDURE — 2500000003 HC RX 250 WO HCPCS: Performed by: NURSE ANESTHETIST, CERTIFIED REGISTERED

## 2022-10-24 PROCEDURE — 2500000003 HC RX 250 WO HCPCS: Performed by: STUDENT IN AN ORGANIZED HEALTH CARE EDUCATION/TRAINING PROGRAM

## 2022-10-24 PROCEDURE — 3700000000 HC ANESTHESIA ATTENDED CARE: Performed by: SURGERY

## 2022-10-24 PROCEDURE — 0FT44ZZ RESECTION OF GALLBLADDER, PERCUTANEOUS ENDOSCOPIC APPROACH: ICD-10-PCS | Performed by: SURGERY

## 2022-10-24 PROCEDURE — 7100000001 HC PACU RECOVERY - ADDTL 15 MIN: Performed by: SURGERY

## 2022-10-24 PROCEDURE — 80048 BASIC METABOLIC PNL TOTAL CA: CPT

## 2022-10-24 PROCEDURE — 2580000003 HC RX 258: Performed by: SURGERY

## 2022-10-24 PROCEDURE — 3700000001 HC ADD 15 MINUTES (ANESTHESIA): Performed by: SURGERY

## 2022-10-24 PROCEDURE — 6360000002 HC RX W HCPCS: Performed by: STUDENT IN AN ORGANIZED HEALTH CARE EDUCATION/TRAINING PROGRAM

## 2022-10-24 PROCEDURE — 99233 SBSQ HOSP IP/OBS HIGH 50: CPT | Performed by: NURSE PRACTITIONER

## 2022-10-24 PROCEDURE — 3600000013 HC SURGERY LEVEL 3 ADDTL 15MIN: Performed by: SURGERY

## 2022-10-24 PROCEDURE — 6360000002 HC RX W HCPCS: Performed by: NURSE PRACTITIONER

## 2022-10-24 PROCEDURE — 6360000002 HC RX W HCPCS: Performed by: NURSE ANESTHETIST, CERTIFIED REGISTERED

## 2022-10-24 PROCEDURE — 80076 HEPATIC FUNCTION PANEL: CPT

## 2022-10-24 PROCEDURE — 2709999900 HC NON-CHARGEABLE SUPPLY: Performed by: SURGERY

## 2022-10-24 PROCEDURE — 2580000003 HC RX 258: Performed by: NURSE ANESTHETIST, CERTIFIED REGISTERED

## 2022-10-24 PROCEDURE — 6360000002 HC RX W HCPCS: Performed by: SURGERY

## 2022-10-24 PROCEDURE — A4217 STERILE WATER/SALINE, 500 ML: HCPCS | Performed by: SURGERY

## 2022-10-24 PROCEDURE — 6370000000 HC RX 637 (ALT 250 FOR IP): Performed by: NURSE PRACTITIONER

## 2022-10-24 PROCEDURE — 36415 COLL VENOUS BLD VENIPUNCTURE: CPT

## 2022-10-24 PROCEDURE — 2720000010 HC SURG SUPPLY STERILE: Performed by: SURGERY

## 2022-10-24 PROCEDURE — 85027 COMPLETE CBC AUTOMATED: CPT

## 2022-10-24 PROCEDURE — 3600000003 HC SURGERY LEVEL 3 BASE: Performed by: SURGERY

## 2022-10-24 PROCEDURE — 64488 TAP BLOCK BI INJECTION: CPT | Performed by: STUDENT IN AN ORGANIZED HEALTH CARE EDUCATION/TRAINING PROGRAM

## 2022-10-24 RX ORDER — MAGNESIUM HYDROXIDE 1200 MG/15ML
LIQUID ORAL CONTINUOUS PRN
Status: COMPLETED | OUTPATIENT
Start: 2022-10-24 | End: 2022-10-24

## 2022-10-24 RX ORDER — OXYCODONE HYDROCHLORIDE 5 MG/1
5 TABLET ORAL EVERY 4 HOURS PRN
Status: DISCONTINUED | OUTPATIENT
Start: 2022-10-24 | End: 2022-10-25 | Stop reason: HOSPADM

## 2022-10-24 RX ORDER — KETOROLAC TROMETHAMINE 15 MG/ML
15 INJECTION, SOLUTION INTRAMUSCULAR; INTRAVENOUS EVERY 6 HOURS
Status: DISCONTINUED | OUTPATIENT
Start: 2022-10-24 | End: 2022-10-25 | Stop reason: HOSPADM

## 2022-10-24 RX ORDER — PROPOFOL 10 MG/ML
INJECTION, EMULSION INTRAVENOUS PRN
Status: DISCONTINUED | OUTPATIENT
Start: 2022-10-24 | End: 2022-10-24 | Stop reason: SDUPTHER

## 2022-10-24 RX ORDER — KETOROLAC TROMETHAMINE 30 MG/ML
INJECTION, SOLUTION INTRAMUSCULAR; INTRAVENOUS PRN
Status: DISCONTINUED | OUTPATIENT
Start: 2022-10-24 | End: 2022-10-24 | Stop reason: SDUPTHER

## 2022-10-24 RX ORDER — FENTANYL CITRATE 50 UG/ML
INJECTION, SOLUTION INTRAMUSCULAR; INTRAVENOUS PRN
Status: DISCONTINUED | OUTPATIENT
Start: 2022-10-24 | End: 2022-10-24 | Stop reason: SDUPTHER

## 2022-10-24 RX ORDER — ROPIVACAINE HYDROCHLORIDE 5 MG/ML
INJECTION, SOLUTION EPIDURAL; INFILTRATION; PERINEURAL
Status: DISCONTINUED | OUTPATIENT
Start: 2022-10-24 | End: 2022-10-24 | Stop reason: SDUPTHER

## 2022-10-24 RX ORDER — METOCLOPRAMIDE HYDROCHLORIDE 5 MG/ML
10 INJECTION INTRAMUSCULAR; INTRAVENOUS
Status: DISCONTINUED | OUTPATIENT
Start: 2022-10-24 | End: 2022-10-24 | Stop reason: HOSPADM

## 2022-10-24 RX ORDER — LIDOCAINE HYDROCHLORIDE 10 MG/ML
1 INJECTION, SOLUTION EPIDURAL; INFILTRATION; INTRACAUDAL; PERINEURAL
Status: DISCONTINUED | OUTPATIENT
Start: 2022-10-24 | End: 2022-10-24 | Stop reason: HOSPADM

## 2022-10-24 RX ORDER — ENOXAPARIN SODIUM 100 MG/ML
30 INJECTION SUBCUTANEOUS 2 TIMES DAILY
Status: DISCONTINUED | OUTPATIENT
Start: 2022-10-24 | End: 2022-10-25 | Stop reason: HOSPADM

## 2022-10-24 RX ORDER — ONDANSETRON 2 MG/ML
4 INJECTION INTRAMUSCULAR; INTRAVENOUS
Status: DISCONTINUED | OUTPATIENT
Start: 2022-10-24 | End: 2022-10-24 | Stop reason: HOSPADM

## 2022-10-24 RX ORDER — SODIUM CHLORIDE, SODIUM LACTATE, POTASSIUM CHLORIDE, CALCIUM CHLORIDE 600; 310; 30; 20 MG/100ML; MG/100ML; MG/100ML; MG/100ML
INJECTION, SOLUTION INTRAVENOUS CONTINUOUS PRN
Status: DISCONTINUED | OUTPATIENT
Start: 2022-10-24 | End: 2022-10-24 | Stop reason: SDUPTHER

## 2022-10-24 RX ORDER — OXYCODONE HYDROCHLORIDE 5 MG/1
5 TABLET ORAL PRN
Status: DISCONTINUED | OUTPATIENT
Start: 2022-10-24 | End: 2022-10-24 | Stop reason: HOSPADM

## 2022-10-24 RX ORDER — LIDOCAINE HYDROCHLORIDE 20 MG/ML
INJECTION, SOLUTION INTRAVENOUS PRN
Status: DISCONTINUED | OUTPATIENT
Start: 2022-10-24 | End: 2022-10-24 | Stop reason: SDUPTHER

## 2022-10-24 RX ORDER — POTASSIUM CHLORIDE 20 MEQ/1
40 TABLET, EXTENDED RELEASE ORAL ONCE
Status: COMPLETED | OUTPATIENT
Start: 2022-10-24 | End: 2022-10-24

## 2022-10-24 RX ORDER — CYCLOBENZAPRINE HCL 10 MG
10 TABLET ORAL 3 TIMES DAILY PRN
Status: DISCONTINUED | OUTPATIENT
Start: 2022-10-24 | End: 2022-10-25 | Stop reason: HOSPADM

## 2022-10-24 RX ORDER — OXYCODONE HYDROCHLORIDE 5 MG/1
10 TABLET ORAL EVERY 4 HOURS PRN
Status: DISCONTINUED | OUTPATIENT
Start: 2022-10-24 | End: 2022-10-25 | Stop reason: HOSPADM

## 2022-10-24 RX ORDER — ROCURONIUM BROMIDE 10 MG/ML
INJECTION, SOLUTION INTRAVENOUS PRN
Status: DISCONTINUED | OUTPATIENT
Start: 2022-10-24 | End: 2022-10-24 | Stop reason: SDUPTHER

## 2022-10-24 RX ORDER — MEPERIDINE HYDROCHLORIDE 25 MG/ML
12.5 INJECTION INTRAMUSCULAR; INTRAVENOUS; SUBCUTANEOUS
Status: DISCONTINUED | OUTPATIENT
Start: 2022-10-24 | End: 2022-10-24 | Stop reason: HOSPADM

## 2022-10-24 RX ORDER — SENNA AND DOCUSATE SODIUM 50; 8.6 MG/1; MG/1
2 TABLET, FILM COATED ORAL 2 TIMES DAILY
Status: DISCONTINUED | OUTPATIENT
Start: 2022-10-24 | End: 2022-10-25 | Stop reason: HOSPADM

## 2022-10-24 RX ORDER — FENTANYL CITRATE 50 UG/ML
50 INJECTION, SOLUTION INTRAMUSCULAR; INTRAVENOUS EVERY 10 MIN PRN
Status: DISCONTINUED | OUTPATIENT
Start: 2022-10-24 | End: 2022-10-24 | Stop reason: HOSPADM

## 2022-10-24 RX ORDER — OXYCODONE HYDROCHLORIDE 5 MG/1
10 TABLET ORAL PRN
Status: DISCONTINUED | OUTPATIENT
Start: 2022-10-24 | End: 2022-10-24 | Stop reason: HOSPADM

## 2022-10-24 RX ORDER — DIPHENHYDRAMINE HYDROCHLORIDE 50 MG/ML
12.5 INJECTION INTRAMUSCULAR; INTRAVENOUS
Status: DISCONTINUED | OUTPATIENT
Start: 2022-10-24 | End: 2022-10-24 | Stop reason: HOSPADM

## 2022-10-24 RX ORDER — DEXAMETHASONE SODIUM PHOSPHATE 4 MG/ML
INJECTION, SOLUTION INTRA-ARTICULAR; INTRALESIONAL; INTRAMUSCULAR; INTRAVENOUS; SOFT TISSUE PRN
Status: DISCONTINUED | OUTPATIENT
Start: 2022-10-24 | End: 2022-10-24 | Stop reason: SDUPTHER

## 2022-10-24 RX ORDER — ONDANSETRON 2 MG/ML
INJECTION INTRAMUSCULAR; INTRAVENOUS PRN
Status: DISCONTINUED | OUTPATIENT
Start: 2022-10-24 | End: 2022-10-24 | Stop reason: SDUPTHER

## 2022-10-24 RX ORDER — ACETAMINOPHEN 325 MG/1
650 TABLET ORAL EVERY 6 HOURS
Status: DISCONTINUED | OUTPATIENT
Start: 2022-10-24 | End: 2022-10-25 | Stop reason: HOSPADM

## 2022-10-24 RX ADMIN — DEXAMETHASONE SODIUM PHOSPHATE 4 MG: 4 INJECTION, SOLUTION INTRAMUSCULAR; INTRAVENOUS at 13:44

## 2022-10-24 RX ADMIN — FENTANYL CITRATE 100 MCG: 50 INJECTION, SOLUTION INTRAMUSCULAR; INTRAVENOUS at 13:44

## 2022-10-24 RX ADMIN — Medication 10 ML: at 23:10

## 2022-10-24 RX ADMIN — ACETAMINOPHEN 650 MG: 325 TABLET ORAL at 17:00

## 2022-10-24 RX ADMIN — ROCURONIUM BROMIDE 50 MG: 10 INJECTION INTRAVENOUS at 13:44

## 2022-10-24 RX ADMIN — ENOXAPARIN SODIUM 30 MG: 100 INJECTION SUBCUTANEOUS at 23:04

## 2022-10-24 RX ADMIN — ONDANSETRON 4 MG: 2 INJECTION INTRAMUSCULAR; INTRAVENOUS at 14:48

## 2022-10-24 RX ADMIN — SUGAMMADEX 200 MG: 100 INJECTION, SOLUTION INTRAVENOUS at 14:50

## 2022-10-24 RX ADMIN — POTASSIUM CHLORIDE 40 MEQ: 1500 TABLET, EXTENDED RELEASE ORAL at 17:00

## 2022-10-24 RX ADMIN — Medication 10 ML: at 09:57

## 2022-10-24 RX ADMIN — PROPOFOL 150 MG: 10 INJECTION, EMULSION INTRAVENOUS at 13:44

## 2022-10-24 RX ADMIN — LIDOCAINE HYDROCHLORIDE 50 MG: 20 INJECTION, SOLUTION INTRAVENOUS at 13:44

## 2022-10-24 RX ADMIN — DOCUSATE SODIUM 50 MG AND SENNOSIDES 8.6 MG 2 TABLET: 8.6; 5 TABLET, FILM COATED ORAL at 23:05

## 2022-10-24 RX ADMIN — LIDOCAINE HYDROCHLORIDE 10 ML: 10; .005 INJECTION, SOLUTION EPIDURAL; INFILTRATION; INTRACAUDAL; PERINEURAL at 13:51

## 2022-10-24 RX ADMIN — ACETAMINOPHEN 650 MG: 325 TABLET ORAL at 23:05

## 2022-10-24 RX ADMIN — ROPIVACAINE HYDROCHLORIDE 30 ML: 5 INJECTION, SOLUTION EPIDURAL; INFILTRATION; PERINEURAL at 13:48

## 2022-10-24 RX ADMIN — FENTANYL CITRATE 50 MCG: 50 INJECTION, SOLUTION INTRAMUSCULAR; INTRAVENOUS at 15:24

## 2022-10-24 RX ADMIN — SODIUM CHLORIDE, POTASSIUM CHLORIDE, SODIUM LACTATE AND CALCIUM CHLORIDE: 600; 310; 30; 20 INJECTION, SOLUTION INTRAVENOUS at 13:41

## 2022-10-24 RX ADMIN — PIPERACILLIN AND TAZOBACTAM 3375 MG: 3; .375 INJECTION, POWDER, FOR SOLUTION INTRAVENOUS at 05:31

## 2022-10-24 RX ADMIN — PIPERACILLIN AND TAZOBACTAM 3375 MG: 3; .375 INJECTION, POWDER, FOR SOLUTION INTRAVENOUS at 12:15

## 2022-10-24 RX ADMIN — KETOROLAC TROMETHAMINE 15 MG: 15 INJECTION, SOLUTION INTRAMUSCULAR; INTRAVENOUS at 23:08

## 2022-10-24 RX ADMIN — FENTANYL CITRATE 50 MCG: 50 INJECTION, SOLUTION INTRAMUSCULAR; INTRAVENOUS at 15:36

## 2022-10-24 RX ADMIN — KETOROLAC TROMETHAMINE 30 MG: 30 INJECTION, SOLUTION INTRAMUSCULAR at 14:48

## 2022-10-24 ASSESSMENT — PAIN DESCRIPTION - DESCRIPTORS: DESCRIPTORS: ACHING;TIGHTNESS

## 2022-10-24 ASSESSMENT — PAIN SCALES - GENERAL
PAINLEVEL_OUTOF10: 3
PAINLEVEL_OUTOF10: 6
PAINLEVEL_OUTOF10: 6
PAINLEVEL_OUTOF10: 0
PAINLEVEL_OUTOF10: 4
PAINLEVEL_OUTOF10: 6

## 2022-10-24 ASSESSMENT — PAIN DESCRIPTION - LOCATION: LOCATION: ABDOMEN

## 2022-10-24 ASSESSMENT — PAIN DESCRIPTION - ORIENTATION: ORIENTATION: MID

## 2022-10-24 NOTE — OP NOTE
Operative Note      Patient: Luz Marina Moore  YOB: 1995  MRN: [id number]    Date of Procedure: 10/24/2022    Pre-Op Diagnosis: Choledocholithiasis with chronic cholecystitis [K80.44]    Post-Op Diagnosis: Same       Procedure(s):  CHOLECYSTECTOMY LAPAROSCOPIC    Surgeon(s):  En Galindo MD    Assistant:   First Assistant: Lluvia Aguirre    Anesthesia: General    Estimated Blood Loss (mL): Minimal    Complications: None    Specimens:   ID Type Source Tests Collected by Time Destination   A : Gallbladder Tissue Gallbladder SURGICAL PATHOLOGY En Galindo MD 10/24/2022 1404        Implants:  * No implants in log *      Drains: * No LDAs found *    Findings: mild gallbladder wall inflammation, gallbladder distention, small stones in infundibulum which were milked back into gallbladder before ductotomy. 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 Detailed Description of Procedure:   After informed consent was obtained, the patient was brought back to the operating room and placed supine on the OR table. SCDs were on and functioning. General anesthesia was induced which the patient tolerated well. Bilateral TAP blocks were performed by anesthesia. The abdomen was prepped and draped in the usual fashion. A time-out was conducted confirming the correct patient, procedure, position, equipment, indications, and antibiotics prior to the start of the case. A curvilinear supraumbilical incision was made and carried down to the fascia which was grasped and elevated with Kocher clamps. The fascia was incised and entry into the peritoneum was bluntly performed. A finger sweep confirmed no underlying adhesions. A figure of eight suture of 0 vicryl was placed through the fascia and the Tee trochar was introduced and secured with the balloon and with the retention suture. The abdomen was insufflated to 15mmHg which the patient tolerated well.    A 10mm 30deg scope was introduced and the abdomen was inspected -- there were no underlying injuries from entry an the gallbladder was noted to be distended and mildly inflamed. There were mild periphepatic adhesions consistent with possible Ixxv-Jjuy-Wkkfju syndrome. She was repositioned in steep reverse Trendelenberg with the right side up. Three additional 5mm trochars were introduced in the subcostal right upper quadrant and the subxiphoid midline. The gallbladder was grasped and elevated. Mild adhesions to the duodenum were bluntly taken down, revealing the infundibulum of the gallbladder. Blunt dissection was performed and a critical view of safety was obtained. The cystic artery was doubly clipped and ligated. The cystic duct had numerous small stones in it which were milked back up to the gallbladder before it was also doubly clipped and ligated. The duct was moderately distended and was barely controlled by the 5mm clips, so the stump was further ligated with a vicryl endoloop to prevent bile leak. The gallbladder was dissected free from the cystic plate with cautery. The scope was removed and a 5mm 30deg scope was introduced through the subxiphoid port. The gallbladder was placed intact in an endocatch bag and removed under direct visualization before being passed off the table as a specimen. The RUQ was then irrigated and suctioned clean. The patient was returned to supine position. The upper abdominal ports were removed and the abdomen allowed to desufflate before the umbilical port site was closed with the previously placed vicryl suture. The skin sites were irrigated and hemostasis was obtained with cautery. The skin was closed with subcuticular 4-0 monocryl sutures and dressed with dermabond. All sponge, needle, and instrument counts were correct at the conclusion of the case. The patient was awakened, extubated, and taken to the PACU in stable condition.   I performed all portions of the case. Due to the nature of the surgery, a skilled surgical assistant was required for visualization, retraction, and camera control.     Electronically signed by Jose Luis Ceballos MD on 10/24/2022 at 3:11 PM

## 2022-10-24 NOTE — ANESTHESIA PRE PROCEDURE
Department of Anesthesiology  Preprocedure Note       Name:  Yasmani Alegre   Age:  32 y.o.  :  1995                                          MRN:  [id number]         Date:  10/24/2022      Surgeon: Brent Strange):  Angeles Mejía MD    Procedure: Procedure(s):  CHOLECYSTECTOMY LAPAROSCOPIC    Medications prior to admission:   Prior to Admission medications    Medication Sig Start Date End Date Taking?  Authorizing Provider   loratadine (CLARITIN) 10 MG tablet Take 1 tablet by mouth daily  Patient not taking: No sig reported 19  Yes Judy Hernandes MD   fluticasone [facility]) 50 MCG/ACT nasal spray 1 spray by Nasal route daily 19  Judy Hernandes MD       Current medications:    Current Facility-Administered Medications   Medication Dose Route Frequency Provider Last Rate Last Admin    potassium chloride (KLOR-CON M) extended release tablet 40 mEq  40 mEq Oral Once Stevphen Duty, APRN - NP        lactated ringers infusion   IntraVENous Continuous MARYLIN Gregory 75 mL/hr at 10/24/22 0747 Rate Verify at 10/24/22 0747    sennosides-docusate sodium (SENOKOT-S) 8.6-50 MG tablet 2 tablet  2 tablet Oral Daily PRN MARYLIN Gregory        piperacillin-tazobactam (ZOSYN) 3,375 mg in dextrose 5 % 50 mL IVPB (mini-bag)  3,375 mg IntraVENous Q6H Kraig Griggs MD   Stopped at 10/24/22 1258    acetaminophen (TYLENOL) tablet 650 mg  650 mg Oral Q4H PRN Kraig Griggs MD   650 mg at 10/23/22 1209    ondansetron (ZOFRAN-ODT) disintegrating tablet 4 mg  4 mg Oral Q8H PRN Kraig Griggs MD        Or    [facility]ron [facility] injection 4 mg  4 mg IntraVENous Q6H PRN Kraig Griggs MD        HYDROmorphone (DILAUDID) injection 0.5 mg  0.5 mg IntraVENous Q3H PRN Kraig Griggs MD   0.5 mg at 10/23/22 0146    sodium chloride flush 0.9 % injection 5-40 mL  5-40 mL IntraVENous 2 times per day Kraig Griggs MD   10 mL at 10/24/22 0957    sodium chloride flush 0.9 % injection 5-40 mL 5-40 mL IntraVENous PRN Sonam Kerr MD        0.9 % sodium chloride infusion   IntraVENous PRN Sonam Kerr MD        bisacodyl (DULCOLAX) suppository 10 mg  10 mg Rectal Daily PRN Sonam Kerr MD        enoxaparin Sodium (LOVENOX) injection 30 mg  30 mg SubCUTAneous BID Sonam Kerr MD   30 mg at 10/23/22 0809       Allergies:  No Known Allergies    Problem List:    Patient Active Problem List   Diagnosis Code    Gallstone (impacted) X36.31    Biliary colic D28.68    Choledocholithiasis with chronic cholecystitis K80.44       Past Medical History:  History reviewed. No pertinent past medical history. Past Surgical History:  History reviewed. No pertinent surgical history. Social History:    Social History     Tobacco Use    Smoking status: Passive Smoke Exposure - Never Smoker    Smokeless tobacco: Never   Substance Use Topics    Alcohol use: No                                Counseling given: Not Answered      Vital Signs (Current):   Vitals:    10/23/22 1915 10/24/22 0730 10/24/22 0821 10/24/22 1241   BP: 122/80  106/61 109/63   Pulse: 73  76 80   Resp: 16  16 20   Temp: 97.9 °F (36.6 °C)  97.9 °F (36.6 °C) 98.7 °F (37.1 °C)   TempSrc: Oral  Oral Temporal   SpO2:   96% 98%   Weight:  226 lb 6.6 oz (102.7 kg)     Height:                                                  BP Readings from Last 3 Encounters:   10/24/22 109/63   02/12/19 (!) 152/88       NPO Status: Time of last liquid consumption: 0000                        Time of last solid consumption: 0000                        Date of last liquid consumption: 10/24/22                        Date of last solid food consumption: 10/24/22    BMI:   Wt Readings from Last 3 Encounters:   10/24/22 226 lb 6.6 oz (102.7 kg)   02/12/19 230 lb (104.3 kg)     Body mass index is 40.11 kg/m².     CBC:   Lab Results   Component Value Date/Time    WBC 11.0 10/24/2022 06:42 AM    RBC 4.37 10/24/2022 06:42 AM    HGB 11.6 10/24/2022 06:42 AM    HCT 35.1 10/24/2022 06:42 AM    MCV 80.5 10/24/2022 06:42 AM    RDW 15.3 10/24/2022 06:42 AM     10/24/2022 06:42 AM       CMP:   Lab Results   Component Value Date/Time     10/24/2022 06:42 AM    K 3.7 10/24/2022 06:42 AM     10/24/2022 06:42 AM    CO2 24 10/24/2022 06:42 AM    BUN 7 10/24/2022 06:42 AM    CREATININE 0.52 10/24/2022 06:42 AM    LABGLOM >60.0 10/24/2022 06:42 AM    GLUCOSE 85 10/24/2022 06:42 AM    PROT 6.4 10/24/2022 06:42 AM    CALCIUM 8.6 10/24/2022 06:42 AM    BILITOT 0.6 10/24/2022 06:42 AM    ALKPHOS 347 10/24/2022 06:42 AM    AST 69 10/24/2022 06:42 AM     10/24/2022 06:42 AM       POC Tests: No results for input(s): POCGLU, POCNA, POCK, POCCL, POCBUN, POCHEMO, POCHCT in the last 72 hours. Coags:   Lab Results   Component Value Date/Time    PROTIME 14.1 10/23/2022 06:02 AM    INR 1.1 10/23/2022 06:02 AM       HCG (If Applicable): No results found for: PREGTESTUR, PREGSERUM, HCG, HCGQUANT     ABGs: No results found for: PHART, PO2ART, VWK8IZF, KUY3MWL, BEART, Z0DTTFZB     Type & Screen (If Applicable):  No results found for: LABABO, LABRH    Drug/Infectious Status (If Applicable):  Lab Results   Component Value Date/Time    HEPCAB NONREACTIVE 10/23/2022 06:01 AM       COVID-19 Screening (If Applicable): No results found for: COVID19        Anesthesia Evaluation  Patient summary reviewed and Nursing notes reviewed no history of anesthetic complications:   Airway: Mallampati: II  TM distance: >3 FB   Neck ROM: full  Mouth opening: > = 3 FB   Dental: normal exam         Pulmonary:Negative Pulmonary ROS and normal exam                               Cardiovascular:Negative CV ROS  Exercise tolerance: good (>4 METS),            Beta Blocker:  Not on Beta Blocker         Neuro/Psych:   Negative Neuro/Psych ROS              GI/Hepatic/Renal: Neg GI/Hepatic/Renal ROS  (+) morbid obesity         ROS comment: BMI 40.    Endo/Other:    (+) blood dyscrasia: anemia:., .          Pt had PAT visit. Abdominal:             Vascular: negative vascular ROS. Other Findings:           Anesthesia Plan      general and regional     ASA 2     (ETT  TAP)  Induction: intravenous. MIPS: Postoperative opioids intended and Prophylactic antiemetics administered. Anesthetic plan and risks discussed with patient. Plan discussed with CRNA.     Attending anesthesiologist reviewed and agrees with Pre Eval content      Post-op pain plan if not by surgeon: siomara Washington DO   10/24/2022

## 2022-10-24 NOTE — PROGRESS NOTES
Shift assessment complete. VSS. Patient calm and cooperative. Meds given per MAR. Pt NPO for gallbladder removal surgery later this afternoon. Denies pain, nausea, or feeling SOB. Call light in reach. No needs at this time. 1630 - Pt returned to 4W. PO meds given. Tolerating regular diet. No needs at this time.    Electronically signed by Bozena Starr RN on 10/24/2022 at 5:46 PM

## 2022-10-24 NOTE — ADDENDUM NOTE
Addendum  created 10/24/22 1607 by Mohit Villatoro,     Child order released for a procedure order, Clinical Note Signed, Intraprocedure Blocks edited, Intraprocedure Meds edited, SmartForm saved

## 2022-10-24 NOTE — CARE COORDINATION
Met with patient, freedom of choice offered. Has pcp list,  aware and will make appointment to get patient established prior to dc.

## 2022-10-24 NOTE — ANESTHESIA POSTPROCEDURE EVALUATION
Department of Anesthesiology  Postprocedure Note    Patient: Kyra Ravi  MRN: [id number]  [place]: 1995  Date of evaluation: 10/24/2022      Procedure Summary     Date: 10/24/22 Room / Location: [address]    Anesthesia Start: 3440 Anesthesia Stop: 1513    Procedure: CHOLECYSTECTOMY LAPAROSCOPIC Diagnosis:       Choledocholithiasis with chronic cholecystitis      (Choledocholithiasis with chronic cholecystitis [K80.44])    Surgeons: Fabien Hatch MD Responsible Provider: Estrellita Ontiveros DO    Anesthesia Type: General ASA Status: 2          Anesthesia Type: General    Breanna Phase I:      Breanna Phase II:        Anesthesia Post Evaluation    Patient location during evaluation: bedside  Patient participation: complete - patient participated  Level of consciousness: awake and awake and alert  Pain score: 0  Airway patency: patent  Nausea & Vomiting: no nausea and no vomiting  Complications: no  Cardiovascular status: blood pressure returned to baseline and hemodynamically stable  Respiratory status: acceptable and face mask  Hydration status: euvolemic

## 2022-10-24 NOTE — PROGRESS NOTES
EMERGENCY GENERAL SURGERY DAILY PROGRESS NOTE      Patient Name: Kyra Ravi  Admission Date 10/22/2022    Hospital Day: 2  Patient seen and examined on 10/24/2022    INTERVAL HISTORY/EVENTS     Background:  Kyra Ravi is a 32 y.o. female without significant PMHx. Recent pregnancy with vaginal delivery 4 months ago. No prior abdominal surgeries. Patient presents to [facility] [place] on 10/22/2022 with complaint of moderate-severe abdominal pain associated with nausea for 3 days. Patient started having episodes of biliary colic (vs passing stones) 5 months ago during pregnancy. Over the last two months, episodes last several hours to 1-2 days and occur ever 1-2 weeks. During this past week, patient had several recurring episodes of RUQ pain with nausea and vomiting. ED evaluation concerning for choledocholithiasis with distended GB (no stranding, PCF), elevated bilirubin, elevated alk phos, and elevated transaminases. Patient admitted to [facility] for management of choledocholithiasis. Hospital Course:  10/22/2022: admitted to [facility] for choledocholithiasis management. Started on IV Zosyn. NPO.  10/23/2022: remain on [facility] for LFT/bilirubin trend for likely lap emiliana      24 Hour Events: This is my first time meeting the patient. Maggie Horan. Patient reports improvement in pain overnight. Denies N/V. Patient wishes to proceed with OR today, consent obtained. Plan for lap emiliana today pending OR availability. Vitals: afebrile. VSS on RA    Labs: leukocytosis down to 11 (12.1) . Hgb 11.6 and stable. K+ 3.7 and replenished. LFTs improved  - decreased ALT/AST and T bili. All other labs acceptable. UOP: 4 unmeasured occurrences  No BM documented      PHYSICAL EXAM     Vitals Average, Min and Max for last 24 hours:  Temp: Temp: 97.9 °F (36.6 °C);  Temp  Av.9 °F (36.6 °C)  Min: 97.9 °F (36.6 °C)  Max: 97.9 °F (36.6 °C)  Respirations: Resp  Av  Min: 16  Max: 16  Pulse: Pulse  Av  Min: 73  Max: 73  Blood Pressure: Systolic (06ASW), YNF:443 , Min:122 , IKJ:406   ; Diastolic (15IQT), JPV:58, Min:80, Max:80    SpO2: No data recorded    24hr Intake/Output:   Intake/Output Summary (Last 24 hours) at 10/24/2022 0823  Last data filed at 10/24/2022 0747  Gross per 24 hour   Intake 2246.57 ml   Output --   Net 2246.57 ml         Vitals: /80   Pulse 73   Temp 97.9 °F (36.6 °C) (Oral)   Resp 16   Ht 5' 3\" (1.6 m)   Wt 226 lb 6.6 oz (102.7 kg)   LMP 10/08/2022 (Approximate)   SpO2 97%   BMI 40.11 kg/m²     Physical Exam:  Constitutional: laying in bed comfortably. NAD pleasant and conversant  HEENT: Atraumatic normocephalic. Cardiovascular: Regular rate and rhythm. Pulmonary: Clear to ausculation bilaterally. No wheezing, rhonchi or rales. Non labored breathing on RA  Abdominal: Soft. Non-distended. Only mild tenderness to deep palpation of RUQ on inspiration. Otherwise, no TTP. No guarding/rebound TTP. Musculoskeletal: Good ROM in all extremities. No edema. Neurological: Alert, awake, and orientated x 3. Motor and sensory grossly intact. No focal deficits. GCS of 15.   Skin: warm/dry    LABORATORY RESULTS (LAST 24 HOURS)     CBC with Differential:    Lab Results   Component Value Date/Time    WBC 11.0 10/24/2022 06:42 AM    RBC 4.37 10/24/2022 06:42 AM    HGB 11.6 10/24/2022 06:42 AM    HCT 35.1 10/24/2022 06:42 AM     10/24/2022 06:42 AM    MCV 80.5 10/24/2022 06:42 AM    MCH 26.6 10/24/2022 06:42 AM    MCHC 33.1 10/24/2022 06:42 AM    RDW 15.3 10/24/2022 06:42 AM    LYMPHOPCT 15.2 10/23/2022 06:02 AM    MONOPCT 6.8 10/23/2022 06:02 AM    BASOPCT 0.5 10/23/2022 06:02 AM    MONOSABS 0.8 10/23/2022 06:02 AM    LYMPHSABS 1.8 10/23/2022 06:02 AM    EOSABS 0.0 10/23/2022 06:02 AM    BASOSABS 0.1 10/23/2022 06:02 AM     CMP:    Lab Results   Component Value Date/Time     10/23/2022 06:02 AM    K 3.6 10/23/2022 06:02 AM     10/23/2022 06:02 AM    CO2 25 10/23/2022 06:02 AM    BUN 9 10/23/2022 06:02 AM    CREATININE 0.62 10/23/2022 06:02 AM    LABGLOM >60.0 10/23/2022 06:02 AM    GLUCOSE 84 10/23/2022 06:02 AM    PROT 6.5 10/23/2022 06:02 AM    LABALBU 3.7 10/23/2022 06:02 AM    CALCIUM 8.5 10/23/2022 06:02 AM    BILITOT 0.8 10/23/2022 06:02 AM    ALKPHOS 462 10/23/2022 06:02 AM     10/23/2022 06:02 AM     10/23/2022 06:02 AM     Magnesium:  No results found for: MG  PT/INR:    Lab Results   Component Value Date/Time    PROTIME 14.1 10/23/2022 06:02 AM    INR 1.1 10/23/2022 06:02 AM     PTT:  No results found for: APTT, PTT    IMAGING RESULTS (PERSONALLY REVIEWED)     Now new imaging. ASSESSMENT & PLAN     Diagnoses:  Cholecystitis 2/2 choledocholithiasis  Acute abdominal pain  Nausea  Leukocytosis    PMHx: significant PMHx. Recent pregnancy with vaginal delivery 4 months ago. No prior abdominal surgeries    Home meds: denies any home meds (discussed RAL, 10/23)    Incidental Findings: distended GB with gallstones (related to admission diagnosis, discussed with patient)      ASSESSMENT/PLAN:  Neurological: Acute abdominal pain  - Continue tylenol 650mg q4hr prn  - Continue dilaudid 0.5mg q3hr prn for severe pain only (has not required, but in event of recurring biliary colic/passing GB stone overnight)     Cardiovascular:   - Continue to monitor per RNF nursing protocols     Respiratory:   - Maintain O2 sats > 92%, encourage IS. GI/Diet: Choledocholithiasis resulting in suspected early cholecystitis. Bilirubin and LFTs downtrending this AM.   - NPO for OR, ok for regular diet post op  - Continue bowel regimen senokot-s BID  - LFTs elevated at admission, hepatitis panel negative. Transaminitis now with downtrend, along with bilirubinemia. Renal/Electrolytes: Cr and lytes acceptable. - HLIV post op  - AM BMP     ID: Suspected cholecystitis 2/2 choledocholithiasis. Mild leukocytosis stable 11 (12.1) and afebrile. Not tachycardic.  Normotensive.  - Continue Zosyn q6 until operative intervention  - AM CBC     Heme:   - AM CBC  - no indication for transfusion     Endocrine:   - no acute glycemic issues     MSK:   - Activity: OOBAT  - PT/OT: patient independently ambulatory. Assess for need post-operatively    Prophylaxis:   - SCDs, and chemoppx Lovenox 40mg daily    Lines/Tubes/Drains:  - Maintain PIVs  - No indication for victor catheter, monitor for urinary retention    Dispo: Remain on RNF for laparoscopic cholecystectomy today, anticipate medical clearance later today vs tomorrow if operative course without complication. Accom Status: General Status      Follow up with EGS TBD       Please call for any questions or concerns.   Main Red Treatment Team Sticky Note for contact information]      JOHANA Garcia - NP-C  Trauma/Critical Care  [see Treatment Team Sticky Note for contact information]     This patient's plan of care was discussed and made in collaboration with Trauma Attending physician, Bj Núñez MD.

## 2022-10-24 NOTE — DISCHARGE SUMMARY
[address]  [address]  [facility], [address] [facility] Arthur Santana  MRN: [id number]  YOB: 1995  32 y. o.female      Attending  Simin Richards MD ?   Date of Admission  10/22/2022 Date of Discharge  10/25/2022        DIAGNOSES:  Principal Problem:    S/P laparoscopic cholecystectomy  Active Problems:    Leukocytosis    Acute post-operative pain  Resolved Problems:    Gallstone (impacted)    Biliary colic    Acute abdominal pain    Nausea    Choledocholithiasis with chronic cholecystitis         PROCEDURES:  10/24/2022 Dr. Rik Coto: Isai Olga:   Current Discharge Medication List             Details   oxyCODONE (ROXICODONE) 5 MG immediate release tablet Take 1 tablet by mouth every 6 hours as needed for Pain (severe pain) for up to 5 days. Qty: 20 tablet, Refills: 0    Comments: Reduce doses taken as pain becomes manageable  Associated Diagnoses: Acute post-operative pain; S/P laparoscopic cholecystectomy      acetaminophen (TYLENOL) 325 MG tablet Take 2 tablets by mouth every 6 hours as needed for Pain  Qty: 80 tablet, Refills: 0      ibuprofen (ADVIL;MOTRIN) 600 MG tablet Take 1 tablet by mouth 4 times daily as needed for Pain  Qty: 40 tablet, Refills: 0      ondansetron (ZOFRAN-ODT) 4 MG disintegrating tablet Take 1 tablet by mouth every 8 hours as needed for Nausea or Vomiting  Qty: 30 tablet, Refills: 0      sennosides-docusate sodium (SENOKOT-S) 8.6-50 MG tablet Take 2 tablets by mouth 2 times daily as needed for Constipation  Qty: 30 tablet, Refills: 0      cyclobenzaprine (FLEXERIL) 10 MG tablet Take 1 tablet by mouth 3 times daily as needed for Muscle spasms  Qty: 30 tablet, Refills: 0                Details   fluticasone (FLONASE) 50 MCG/ACT nasal spray 1 spray by Nasal route daily  Qty: 1 Bottle, Refills: 0               REASON FOR HOSPITALIZATION:  Arthur Santana is a 32 y.o. female without significant PMHx.  Recent pregnancy with vaginal delivery 4 months ago. No prior abdominal surgeries. Patient presents to [facility] EM[facility] AT [place] on 10/22/2022 with complaint of moderate-severe abdominal pain associated with nausea for 3 days. Patient started having episodes of biliary colic (vs passing stones) 5 months ago during pregnancy. Over the last two months, episodes last several hours to 1-2 days and occur ever 1-2 weeks. During this past week, patient had several recurring episodes of RUQ pain with nausea and vomiting. ED evaluation concerning for choledocholithiasis with distended GB (no stranding, PCF), elevated bilirubin, elevated alk phos, and elevated transaminases. Patient admitted to [facility] for management of choledocholithiasis. SIGNIFICANT FINDINGS:  Diagnoses:  Chronic cholecystitis 2/2 choledocholithiasis (resolved)  Acute abdominal pain (resolved)  Nausea (resolved)  Leukocytosis   S/p lap emiliana  Acute post op pain    Incidental Findings: distended GB with gallstones (related to admission diagnosis, discussed with patient)       HOSPITAL COURSE:  10/22/2022: admitted to [facility] for choledocholithiasis management. Started on IV Zosyn. NPO.  10/23/2022: remain on [facility] for LFT/bilirubin trend for likely lap emiliana  10/24/2022: s/p lap emiliana  10/25/2022: Alyson Benton. HDS on 2L overnight and weaned to RA this AM, afebrile. Tolerating PO, passing gas and voiding spontaneously. Pain controlled on current regimen. Ambulating at baseline. Labs reviewed ~ K+ 3.8 and replenished, mild leukocytosis with continued downtrend 10.9 (11) and hgb stable, ALT/AST continues to downtrend and T bili down to 0.3 (0.6). Patient denies breastfeeding and so no restrictions needed for post op opioid administration. Patient educated on post operative instructions as well as return precautions. Medically cleared for discharge home, follow up with EGS arranged.     At time of discharge, Julian Aid was tolerating a regular diet, passing gas, and had adequate analgesia on oral pain medications. Pt's activity level was at baseline, ambulatory. The patient had no  signs or symptoms of complications. Patient was determined stable for discharge to: Home    The patient was seen and examined on the day of discharge with the following findings:  Constitutional: laying in bed comfortably. NAD pleasant and conversant  HEENT: Atraumatic normocephalic. Cardiovascular: Regular rate and rhythm. Pulmonary: Clear to ausculation bilaterally. No wheezing, rhonchi or rales. Non labored breathing on RA  Abdominal: Soft. Non-distended. Mild incisional tenderness. Lap sites CDI with mild ecchymosis, no erythema or drainage. Musculoskeletal: Good ROM in all extremities. No edema. Neurological: Alert, awake, and orientated x 3. Motor and sensory grossly intact. No focal deficits. GCS of 15. Skin: warm/dry       ANTICIPATED FOLLOW UP:  Future Appointments   Date Time Provider Jess Valdez   10/31/2022  1:30 PM Kate Peter [facility]-C [facility]   11/9/2022  1:00 PM Edgar Fraire MD [address]     No discharge procedures on file. Other indicated follow up and instructions for scheduling: Follow up with [address] [place] [address] EGS on 11/9 at 1 PM for post op check      VTE RISK AT DISCHARGE:  Per trauma program protocol, the patient does not require post-discharge VTE prophylaxis due to: NWB single LE or BLE fractures limiting mobility. JOHANA Charles NP  Trauma/Critical Care  Emergency General Surgery    [see treatment team sticky note for contact information]      40 minutes were spent on the discharge of this patient including final examination of the patient, discussion of the hospital stay, instructions for continuing care to all relevant caregivers, preparation of discharge records, prescriptions and referral forms, and clear identification of reasons to return to clinic or to emergency room.

## 2022-10-24 NOTE — PROGRESS NOTES
CC/HPI:  Winifred Bashir is a 50 y.o. female with PMH HTN, anxiety, depression, gambling disorder, stimulant use disorder, and AUD who presents today for follow up.  As of 2/25/21, she is 18 months from last use of alcohol or other substances, and 9 months from last episode of gambling.      Pt has continued to receive Vivitrol injections q3 weeks for AUD/gambling disorder through [facility], and sees outside psychiatry for other medications including topiramate 50mg two times a day for AUD/stimulant use disorder, bupropion 300mg/day for stimulant use disorder/depression, and gabapentin 800mg three times a day for AUD and anxiety.      Today, pt reports she has remained sober and free from gambling, about which she is very happy.  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  Stress is not overwhelming.  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      Pt has been experiencing constipation recently, unsure if this is related to medications/perimenopause, etc.  She requests rx's to help address this problem, and she has increased her fluid intake.  She plans to schedule with her PCP to discuss perimenopause and potential testing or treatments for symptoms.  She has no other concerns today.     Objective  Physical Exam  Neurological:      Mental Status: She is alert and oriented to person, place, and time.   Psychiatric:         Attention and Perception: Attention and perception normal.         Mood and Affect: Mood and affect normal.         Speech: Speech normal.         Behavior: Behavior is cooperative.         Thought Content: Thought content normal.         Cognition and Memory: Cognition and memory normal.       Labs:  Recent Results (from the past 240 hour(s))   Ethyl Glucuronide and Ethyl Sulfate,  DVT / VTE PROPHYLAXIS EVALUATION    Estimated Creatinine Clearance: 186 mL/min (based on SCr of 0.52 mg/dL). Recent Labs     10/22/22  1533 10/23/22  0602 10/24/22  0642   BUN 10 9 7   CREATININE 0.53 0.62 0.52   PLT  --  298 324   HGB  --  11.9* 11.6*   HCT  --  35.9* 35.1*   INR  --  1.1  --      ADMITTING DX OR CHIEF COMPLAINT? Gallbladder removal  WARFARIN? DOAC'S? no  ANY APPARENT BLEEDING? no  SCHEDULED SURGERY?  Post op     Current order:  Enoxaparin 40 mg SUBQ once daily      Plan:  Pharmacologic VTE prophylaxis modified based on patient weight per [facility]/P&T approved protocol     Patient Weight (kg)      50.9 and below .9 101-150.9 151-174.9 175 or greater   Estimated   CrCl  (ml/min) 30 or greater []   30 mg   SUBQ daily   []   40 mg   SUBQ daily [x]  30 mg SUBQ   BID  []  40 mg   SUBQ   BID []  60mg SUBQ BID    15-29.9 []  UFH 5000   units SUBQ BID []  30 mg   SUBQ daily [] 30 mg SUBQ   daily []  40 mg SUBQ   daily [] 60 mg SUBQ   daily    Less than 15 or dialysis []  UFH 5000   units SUBQ BID [] UFH 5000 units SUBQ TID []  UFH 7500   units   SUBQ TID         Erich Castellanos [facility] PharmD Urine, Quantitative ([facility] ETG/S)   Result Value Ref Range    Ethyl Glucuronide, Urn, Quant <100 ng/mL    Ethyl Sulfate, Urn, Quant <100 ng/mL   DAM    (Drug Abuse 1+, Urine)   Result Value Ref Range    Amphetamines Screen Negative Screen Negative    Benzodiazepines Screen Negative Screen Negative    Opiates Screen Negative Screen Negative    Phencyclidine Screen Negative Screen Negative    THC Screen Negative Screen Negative    Barbiturates Screen Negative Screen Negative    Cocaine Metabolite Screen Negative Screen Negative    Methadone Screen Negative Screen Negative    Oxycodone Screen Negative Screen Negative    Creatinine, Urine 85.2 mg/dL     8/27/2020:  Alt/ast normal    1. Severe alcohol use disorder (H)  Continue vivitrol q3 weeks; continue topiramate 50mg two times a day, continue gabapentin 800mg tid    2. Gambling disorder, severe  Continue Vivtrol q3 weeks, continue bupropion as prescribed by psychiatry, continue topiramate as prescribed by psychiatry    3. Nicotine use disorder  Encouraged desire and efforts to quit  - nicotine polacrilex (COMMIT) 4 MG lozenge; Apply 1 lozenge (4 mg total) to the mouth or throat as needed for smoking cessation.  Dispense: 200 lozenge; Refill: 5    4. Methamphetamine use disorder, severe (H)  Continue bupropion as prescribed by psychiatry, continue Vivitrol q3 weeks    5. Other constipation  - polyethylene glycol (GLYCOLAX) 17 gram/dose powder; Take 17 g by mouth daily.  Dispense: 850 g; Refill: 11  - docusate sodium (COLACE) 100 MG capsule; Take 1 capsule (100 mg total) by mouth 2 (two) times a day as needed for constipation.  Dispense: 180 capsule; Refill: 3  - senna (SENNA) 8.6 mg tablet; Take 1 tablet by mouth daily as needed for constipation.  Dispense: 30 tablet; Refill: 3      Schedule f/u in 3 months    At least 30min spent in review of medical record, , interim events, symptoms, recommendations, follow up plan    Shahrzad Kumar MD

## 2022-10-24 NOTE — ANESTHESIA PROCEDURE NOTES
Peripheral Block    Patient location during procedure: OR  Reason for block: post-op pain management and at surgeon's request  Start time: 10/24/2022 1:48 PM  End time: 10/24/2022 1:53 PM  Staffing  Performed: anesthesiologist   Anesthesiologist: Wendi Mcintosh DO  Preanesthetic Checklist  Completed: patient identified, IV checked, site marked, risks and benefits discussed, surgical/procedural consents, equipment checked, pre-op evaluation, timeout performed, anesthesia consent given, oxygen available and monitors applied/VS acknowledged  Peripheral Block   Patient position: supine  Prep: ChloraPrep  Provider prep: mask and sterile gloves (Sterile probe cover)  Patient monitoring: cardiac monitor, continuous pulse ox, frequent blood pressure checks and IV access  Block type: TAP  Laterality: bilateral  Injection technique: single-shot  Guidance: ultrasound guided  Local infiltration: ropivacaine  Infiltration strength: 0.5 %  Local infiltration: ropivacaine  Dose: 30 mL    Needle   Needle type: combined needle/nerve stimulator   Needle gauge: 22 G  Needle localization: anatomical landmarks and ultrasound guidance  Needle length: 10 cm  Assessment   Injection assessment: negative aspiration for heme, no paresthesia on injection and local visualized surrounding nerve on ultrasound  Paresthesia pain: immediately resolved  Slow fractionated injection: yes  Hemodynamics: stable  Real-time US image taken/store: yes    Additional Notes  Ultrasound image printed and saved in patient chart.     Sterile probe cover used    Ropivacaine 0.5% 30 ml + lidocaine 1% with epi 1:200 k 10 ml    20 ml bilateral  Medications Administered  ropivacaine (NAROPIN) injection 0.5% - Perineural   30 mL - 10/24/2022 1:48:00 PM

## 2022-10-25 VITALS
BODY MASS INDEX: 40.12 KG/M2 | DIASTOLIC BLOOD PRESSURE: 88 MMHG | HEIGHT: 63 IN | HEART RATE: 77 BPM | WEIGHT: 226.41 LBS | TEMPERATURE: 97.9 F | OXYGEN SATURATION: 99 % | RESPIRATION RATE: 14 BRPM | SYSTOLIC BLOOD PRESSURE: 131 MMHG

## 2022-10-25 PROBLEM — R10.9 ACUTE ABDOMINAL PAIN: Status: RESOLVED | Noted: 2022-10-24 | Resolved: 2022-10-25

## 2022-10-25 PROBLEM — K80.50 BILIARY COLIC: Status: RESOLVED | Noted: 2022-10-22 | Resolved: 2022-10-25

## 2022-10-25 PROBLEM — R11.0 NAUSEA: Status: RESOLVED | Noted: 2022-10-24 | Resolved: 2022-10-25

## 2022-10-25 PROBLEM — K80.20 GALLSTONE (IMPACTED): Status: RESOLVED | Noted: 2022-10-22 | Resolved: 2022-10-25

## 2022-10-25 PROBLEM — K80.44 CHOLEDOCHOLITHIASIS WITH CHRONIC CHOLECYSTITIS: Status: RESOLVED | Noted: 2022-10-22 | Resolved: 2022-10-25

## 2022-10-25 LAB
ALBUMIN SERPL-MCNC: 3.5 G/DL (ref 3.5–4.6)
ALP BLD-CCNC: 296 U/L (ref 40–130)
ALT SERPL-CCNC: 156 U/L (ref 0–33)
ANION GAP SERPL CALCULATED.3IONS-SCNC: 13 MEQ/L (ref 9–15)
AST SERPL-CCNC: 43 U/L (ref 0–35)
BILIRUB SERPL-MCNC: 0.3 MG/DL (ref 0.2–0.7)
BILIRUBIN DIRECT: <0.2 MG/DL (ref 0–0.4)
BILIRUBIN, INDIRECT: ABNORMAL MG/DL (ref 0–0.6)
BUN BLDV-MCNC: 8 MG/DL (ref 6–20)
CALCIUM SERPL-MCNC: 8.4 MG/DL (ref 8.5–9.9)
CHLORIDE BLD-SCNC: 106 MEQ/L (ref 95–107)
CO2: 22 MEQ/L (ref 20–31)
CREAT SERPL-MCNC: 0.56 MG/DL (ref 0.5–0.9)
GFR SERPL CREATININE-BSD FRML MDRD: >60 ML/MIN/{1.73_M2}
GLUCOSE BLD-MCNC: 84 MG/DL (ref 70–99)
HCT VFR BLD CALC: 35.2 % (ref 37–47)
HEMOGLOBIN: 11.7 G/DL (ref 12–16)
MCH RBC QN AUTO: 27 PG (ref 27–31.3)
MCHC RBC AUTO-ENTMCNC: 33.2 % (ref 33–37)
MCV RBC AUTO: 81.3 FL (ref 79.4–94.8)
PDW BLD-RTO: 15.3 % (ref 11.5–14.5)
PLATELET # BLD: 323 K/UL (ref 130–400)
POTASSIUM REFLEX MAGNESIUM: 3.8 MEQ/L (ref 3.4–4.9)
RBC # BLD: 4.33 M/UL (ref 4.2–5.4)
SODIUM BLD-SCNC: 141 MEQ/L (ref 135–144)
TOTAL PROTEIN: 6.4 G/DL (ref 6.3–8)
WBC # BLD: 10.9 K/UL (ref 4.8–10.8)

## 2022-10-25 PROCEDURE — 2700000000 HC OXYGEN THERAPY PER DAY

## 2022-10-25 PROCEDURE — 85027 COMPLETE CBC AUTOMATED: CPT

## 2022-10-25 PROCEDURE — 6370000000 HC RX 637 (ALT 250 FOR IP): Performed by: NURSE PRACTITIONER

## 2022-10-25 PROCEDURE — 99239 HOSP IP/OBS DSCHRG MGMT >30: CPT | Performed by: NURSE PRACTITIONER

## 2022-10-25 PROCEDURE — 36415 COLL VENOUS BLD VENIPUNCTURE: CPT

## 2022-10-25 PROCEDURE — 80076 HEPATIC FUNCTION PANEL: CPT

## 2022-10-25 PROCEDURE — 80048 BASIC METABOLIC PNL TOTAL CA: CPT

## 2022-10-25 PROCEDURE — 6360000002 HC RX W HCPCS: Performed by: NURSE PRACTITIONER

## 2022-10-25 RX ORDER — POTASSIUM CHLORIDE 20 MEQ/1
20 TABLET, EXTENDED RELEASE ORAL ONCE
Status: COMPLETED | OUTPATIENT
Start: 2022-10-25 | End: 2022-10-25

## 2022-10-25 RX ORDER — SENNA AND DOCUSATE SODIUM 50; 8.6 MG/1; MG/1
2 TABLET, FILM COATED ORAL 2 TIMES DAILY PRN
Qty: 30 TABLET | Refills: 0 | Status: SHIPPED | OUTPATIENT
Start: 2022-10-25

## 2022-10-25 RX ORDER — OXYCODONE HYDROCHLORIDE 5 MG/1
5 TABLET ORAL EVERY 6 HOURS PRN
Qty: 20 TABLET | Refills: 0 | Status: SHIPPED | OUTPATIENT
Start: 2022-10-25 | End: 2022-10-30

## 2022-10-25 RX ORDER — ONDANSETRON 4 MG/1
4 TABLET, ORALLY DISINTEGRATING ORAL EVERY 8 HOURS PRN
Qty: 30 TABLET | Refills: 0 | Status: SHIPPED | OUTPATIENT
Start: 2022-10-25 | End: 2022-11-04

## 2022-10-25 RX ORDER — ACETAMINOPHEN 325 MG/1
650 TABLET ORAL EVERY 6 HOURS PRN
Qty: 80 TABLET | Refills: 0 | Status: SHIPPED | OUTPATIENT
Start: 2022-10-25 | End: 2022-11-04

## 2022-10-25 RX ORDER — IBUPROFEN 600 MG/1
600 TABLET ORAL 4 TIMES DAILY PRN
Qty: 40 TABLET | Refills: 0 | Status: SHIPPED | OUTPATIENT
Start: 2022-10-25

## 2022-10-25 RX ORDER — CYCLOBENZAPRINE HCL 10 MG
10 TABLET ORAL 3 TIMES DAILY PRN
Qty: 30 TABLET | Refills: 0 | Status: SHIPPED | OUTPATIENT
Start: 2022-10-25 | End: 2022-11-04

## 2022-10-25 RX ADMIN — ACETAMINOPHEN 650 MG: 325 TABLET ORAL at 05:36

## 2022-10-25 RX ADMIN — KETOROLAC TROMETHAMINE 15 MG: 15 INJECTION, SOLUTION INTRAMUSCULAR; INTRAVENOUS at 09:54

## 2022-10-25 RX ADMIN — ACETAMINOPHEN 650 MG: 325 TABLET ORAL at 09:54

## 2022-10-25 RX ADMIN — ENOXAPARIN SODIUM 30 MG: 100 INJECTION SUBCUTANEOUS at 08:15

## 2022-10-25 RX ADMIN — KETOROLAC TROMETHAMINE 15 MG: 15 INJECTION, SOLUTION INTRAMUSCULAR; INTRAVENOUS at 05:36

## 2022-10-25 RX ADMIN — POTASSIUM CHLORIDE 20 MEQ: 1500 TABLET, EXTENDED RELEASE ORAL at 09:54

## 2022-10-25 RX ADMIN — DOCUSATE SODIUM 50 MG AND SENNOSIDES 8.6 MG 2 TABLET: 8.6; 5 TABLET, FILM COATED ORAL at 08:15

## 2022-10-25 ASSESSMENT — PAIN DESCRIPTION - LOCATION: LOCATION: ABDOMEN

## 2022-10-25 ASSESSMENT — PAIN SCALES - GENERAL: PAINLEVEL_OUTOF10: 2

## 2022-10-25 NOTE — DISCHARGE INSTRUCTIONS
Discharge Instructions    Follow up:  - Please call to schedule your follow-up appointments if not already scheduled- information provided separately. - You will need to follow up with:  - [facility] for follow up after your surgery, this appointment has been scheduled for you. Please call number provided to reschedule if necessary  - Your PCP for post hospitalization follow up  - See below for information regarding contacting your primary care physician or establishing care with [facility]) if you do not already have one. Restrictions:  - Do not lift, push, pull, or drag anything greater than 5-10 lbs for at least 3 weeks. - Do not drive while taking any opioid medications    Pain control:  - Take Tylenol 325 mg, 1-2 tablets every 4 hours as needed for mild to moderate pain. Mild to moderate pain is characterized by pain 1-6 out of 10 on a pain scale. You can rotate Tylenol with Motrin as directed. - Take Oxycodone 5 mg, 1 tablet every 6 hours for as needed for severe pain . Severe pain is characterized as pain of 7-10 out of 10 on a pain scale. - It is okay to take Oxycodone and Tylenol together if needed. If taking Flexeril and Oxycodone, space them out by 1 hour.  - Please begin to wean off of your pain medications as soon as possible. - To do this, start taking Oxycodone every 8 hours instead of every 6, then every 12 hours, then only once per day if needed. Then stop. - You may use Motrin and Tylenol until your pain is diminished enough for you to tolerate your pain. - Do not drive within 24 hours of taking Oxycodone or any Opiate medication. Wound Care and Showering/Bathing:  -  Okay to shower daily -- allow soap and water to run down any incisions sites. Do not scrub the area. Do not submerge your incisions for at least 3 weeks, or until completely healed.     - If you notice any increased redness swelling or drainage from your wound call our office immediately. - You may ice your operative area, which is especially useful to minimize swelling. Make sure that the ice is not in direct contact with your skin, and that the ice does not leak out of it's bag. Double-bagging ice is an effective technique. -  If you begin to experience progressive and rapidly increasing pain that seems out of proportion to what you normally have been experiencing from your baseline pain after surgery/injury - you NEED TO CALL US IMMEDIATELY. Alternatively, you may come into the [facility] Emergency Department IMMEDIATELY for an emergent evaluation by the Trauma team.      Update your primary care physician or establish care:  Please see your primary care physician at the next available appointment for follow up. Please call either on the day of your discharge, or the day after, to make the appointment. If you are followed by a managed care company or if your insurance requires, call your physician for authorization to be seen in a specialty clinic. If you do not have a primary physician please call [phone] for guidance on finding a [facility]) provider.      If you have questions or concerns, if your condition worsens or you  develop new symptoms please call the [facility] [address] [facility] at [phone].    ---------------------------------------------------------------------------------------------------------------------

## 2022-10-25 NOTE — PROGRESS NOTES
Shift assessments completed. A&OX 4.  Medications given per MAR. Call light within reach. No other needs stated by pt at this time. IV on right FA removed due to possible infiltration, pain at site, edema, redness. New IV placed Left AC.

## 2022-10-25 NOTE — PROGRESS NOTES
CLINICAL PHARMACY NOTE: MEDS TO BEDS    Total # of Prescriptions Filled: 6   The following medications were delivered to the patient:  Cyclobenzaprine 10mg tab  Ibuprofen 600mg tab  Acetaminophen 325mg tab  Stool softener 8.6-50mg tab  Ondansetron ODT 4mg  Oxycodone 5mg tab    Additional Documentation:

## 2022-11-09 ENCOUNTER — OFFICE VISIT (OUTPATIENT)
Dept: SURGERY | Age: 27
End: 2022-11-09

## 2022-11-09 VITALS
BODY MASS INDEX: 40.04 KG/M2 | HEART RATE: 84 BPM | WEIGHT: 226 LBS | TEMPERATURE: 98 F | OXYGEN SATURATION: 98 % | HEIGHT: 63 IN

## 2022-11-09 DIAGNOSIS — Z48.89 ENCOUNTER FOR POST SURGICAL WOUND CHECK: Primary | ICD-10-CM

## 2022-11-09 DIAGNOSIS — Z90.49 S/P LAPAROSCOPIC CHOLECYSTECTOMY: ICD-10-CM

## 2022-11-09 PROCEDURE — 99024 POSTOP FOLLOW-UP VISIT: CPT | Performed by: STUDENT IN AN ORGANIZED HEALTH CARE EDUCATION/TRAINING PROGRAM

## 2022-11-09 PROCEDURE — 99024 POSTOP FOLLOW-UP VISIT: CPT | Performed by: SURGERY

## 2022-11-09 NOTE — PATIENT INSTRUCTIONS
- Continue no lifting >25lbs for 2 weeks  - Monitor for signs and symptoms of infection  - Follow up as needed for any questions or concerns

## 2022-11-09 NOTE — PROGRESS NOTES
TRAUMA/EMERGENCY GENERAL SURGERY CLINIC NOTE    Subjective:  Mague Manzano is a 32 y.o. female who is here for follow up for post op visit. Pt underwent a lap cholecystectomy on 10/24/2022 with Dr. Landy Boyce for choledocholithiasis. Pt was discharged home on 10/25/2022. Pt reports that she is doing well post-op. Denies any pain. Using tylenol and ASA for pain. Tolerating PO intake without nausea or vomiting. Having regular bowel movements. Denies fevers/chills, chest pain, shortness of breath. Denies any erythema or drainage from incision sites. Vitals:    11/09/22 1304   Pulse: 84   Temp: 98 °F (36.7 °C)   SpO2: 98%         Physical Exam:  Gen: Alert, well developed, NAD  CV: RRR  Pulm: Non labored respirations, on room air. ABD: Soft, nontender, nondistended. Incisions x 4 c/d/I and almost completely healed. No erythema or drainage  Neuro: No focal deficits, Ox3, appropriate  Ext: no edema, warm and dry       Medications:  Current Outpatient Medications on File Prior to Visit   Medication Sig Dispense Refill    ibuprofen (ADVIL;MOTRIN) 600 MG tablet Take 1 tablet by mouth 4 times daily as needed for Pain 40 tablet 0    sennosides-docusate sodium (SENOKOT-S) 8.6-50 MG tablet Take 2 tablets by mouth 2 times daily as needed for Constipation 30 tablet 0    acetaminophen (TYLENOL) 325 MG tablet Take 2 tablets by mouth every 6 hours as needed for Pain 80 tablet 0    fluticasone (FLONASE) 50 MCG/ACT nasal spray 1 spray by Nasal route daily 1 Bottle 0     No current facility-administered medications on file prior to visit. Labs:  No new labs     Pathology:  Chronic cholecystitis with cholelithiasis. This was reviewed with the patient. Studies:   No new imaging studies    Assessment:  Mague Manzano is a 32 y.o. female who is here for follow up for post op visit. Pt underwent a lap cholecystectomy on 10/24/2022 with Dr. Landy Boyce for choledocholithiasis. Doing well post-op.  Abdomen is soft, non tender and non distended. Incisions are healing well without any signs or symptoms of infection. Pt has no acute complaints. Plan:  - Continue to monitor for signs and symptoms of infection at incision sites  - No lifting/pushing/pulling >25lbs for 2 weeks. After that, no lifting pre-cautions  - Follow up with surgery as needed for any concerns.      Jimmy Hogan PA-C  Trauma/Critical Care  Emergency General Surgery  [phone] ([phone]) [phone]      Patient discussed with attending Trauma Physician, Dr. Liang Moreno MD.

## 2023-11-21 ENCOUNTER — INITIAL PRENATAL (OUTPATIENT)
Dept: OBSTETRICS AND GYNECOLOGY | Facility: CLINIC | Age: 28
End: 2023-11-21
Payer: COMMERCIAL

## 2023-11-21 VITALS — SYSTOLIC BLOOD PRESSURE: 118 MMHG | BODY MASS INDEX: 38.44 KG/M2 | DIASTOLIC BLOOD PRESSURE: 78 MMHG | WEIGHT: 217 LBS

## 2023-11-21 DIAGNOSIS — Z3A.09 9 WEEKS GESTATION OF PREGNANCY (HHS-HCC): ICD-10-CM

## 2023-11-21 DIAGNOSIS — K21.9 GASTROESOPHAGEAL REFLUX DISEASE WITHOUT ESOPHAGITIS: ICD-10-CM

## 2023-11-21 DIAGNOSIS — Z34.80 ENCOUNTER FOR SUPERVISION OF NORMAL PREGNANCY IN MULTIGRAVIDA (HHS-HCC): Primary | ICD-10-CM

## 2023-11-21 PROCEDURE — 99214 OFFICE O/P EST MOD 30 MIN: CPT | Performed by: OBSTETRICS & GYNECOLOGY

## 2023-11-21 PROCEDURE — 87800 DETECT AGNT MULT DNA DIREC: CPT

## 2023-11-21 PROCEDURE — H1000 PRENATAL CARE ATRISK ASSESSM: HCPCS | Performed by: OBSTETRICS & GYNECOLOGY

## 2023-11-21 PROCEDURE — 87086 URINE CULTURE/COLONY COUNT: CPT

## 2023-11-21 RX ORDER — FAMOTIDINE 20 MG/1
20 TABLET, FILM COATED ORAL DAILY
Qty: 90 TABLET | Refills: 3 | Status: SHIPPED | OUTPATIENT
Start: 2023-11-21 | End: 2024-11-20

## 2023-11-21 NOTE — PROGRESS NOTES
Subjective   Patient ID [id number]   Paula Vasquez is a 28 y.o.  at 9w1d with a working estimated date of delivery of 2024, by Last Menstrual Period who presents for an initial prenatal visit. H/o prior term  , 8 lb 12 oz.    Her pregnancy is complicated by:  -h/o twin SAB at 16 wks    OB History    Para Term  AB Living   3 1 1   1 1   SAB IAB Ectopic Multiple Live Births   1       1      # Outcome Date GA Lbr Sascha/2nd Weight Sex Delivery Anes PTL Lv   3 Current            2 Term 22 39w0d  4026 g F Vag-Spont   KELLY   1 SAB  16w0d                 Objective   Physical Exam  Weight: 98.4 kg (217 lb)  Expected Total Weight Gain: 5 kg (11 lb)-9 kg (19 lb)   Pregravid BMI: 38.98  BP: 118/78    OBGyn Exam  Gen in NAD  TVUS with single IUP with +FCA c/w LMP, ELMIRA 24    Prenatal Labs  pending    Assessment/Plan   27 y/o  at 9.1 wks by LMP c/w scan today presenting for new ob visit.  -not yet taking a prenatal vitamin, sent to pharmacy today  -pepcid for GERD  -starting BMI 39, NSTs at 36 wks  -declines flu and covid vaccines, aware they are both strongly recommended in pregnancy  -desires NIPS, will return in 1-2 weeks for OB labs, A1c and NIPS  -information given to schedule NT scan  -aware of practice model, [facility] as delivered her last baby with us    Sade Olivia MD

## 2023-11-22 LAB
BACTERIA UR CULT: NORMAL
C TRACH RRNA SPEC QL NAA+PROBE: NEGATIVE
N GONORRHOEA DNA SPEC QL PROBE+SIG AMP: NEGATIVE

## 2023-12-05 ENCOUNTER — OFFICE VISIT (OUTPATIENT)
Dept: OBSTETRICS AND GYNECOLOGY | Facility: CLINIC | Age: 28
End: 2023-12-05
Payer: COMMERCIAL

## 2023-12-05 DIAGNOSIS — Z34.90 PRENATAL CARE, ANTEPARTUM (HHS-HCC): Primary | ICD-10-CM

## 2023-12-05 LAB
ABO GROUP (TYPE) IN BLOOD: NORMAL
ANTIBODY SCREEN: NORMAL
ERYTHROCYTE [DISTWIDTH] IN BLOOD BY AUTOMATED COUNT: 14.1 % (ref 11.5–14.5)
EST. AVERAGE GLUCOSE BLD GHB EST-MCNC: 103 MG/DL
HBA1C MFR BLD: 5.2 %
HBV SURFACE AG SERPL QL IA: NONREACTIVE
HCT VFR BLD AUTO: 38 % (ref 36–46)
HCV AB SER QL: NONREACTIVE
HGB BLD-MCNC: 12.2 G/DL (ref 12–16)
HIV 1+2 AB+HIV1 P24 AG SERPL QL IA: NONREACTIVE
MCH RBC QN AUTO: 26.9 PG (ref 26–34)
MCHC RBC AUTO-ENTMCNC: 32.1 G/DL (ref 32–36)
MCV RBC AUTO: 84 FL (ref 80–100)
NRBC BLD-RTO: 0 /100 WBCS (ref 0–0)
PLATELET # BLD AUTO: 348 X10*3/UL (ref 150–450)
RBC # BLD AUTO: 4.53 X10*6/UL (ref 4–5.2)
REFLEX ADDED, ANEMIA PANEL: NORMAL
RH FACTOR (ANTIGEN D): NORMAL
RUBV IGG SERPL IA-ACNC: 4.3 IA
RUBV IGG SERPL QL IA: POSITIVE
T PALLIDUM AB SER QL: NONREACTIVE
WBC # BLD AUTO: 10.3 X10*3/UL (ref 4.4–11.3)

## 2023-12-05 PROCEDURE — 86803 HEPATITIS C AB TEST: CPT

## 2023-12-05 PROCEDURE — 85027 COMPLETE CBC AUTOMATED: CPT

## 2023-12-05 PROCEDURE — 86901 BLOOD TYPING SEROLOGIC RH(D): CPT

## 2023-12-05 PROCEDURE — 87340 HEPATITIS B SURFACE AG IA: CPT

## 2023-12-05 PROCEDURE — 83036 HEMOGLOBIN GLYCOSYLATED A1C: CPT

## 2023-12-05 PROCEDURE — 99211 OFF/OP EST MAY X REQ PHY/QHP: CPT | Performed by: OBSTETRICS & GYNECOLOGY

## 2023-12-05 PROCEDURE — 86317 IMMUNOASSAY INFECTIOUS AGENT: CPT

## 2023-12-05 PROCEDURE — 86900 BLOOD TYPING SEROLOGIC ABO: CPT

## 2023-12-05 PROCEDURE — 36415 COLL VENOUS BLD VENIPUNCTURE: CPT

## 2023-12-05 PROCEDURE — 87389 HIV-1 AG W/HIV-1&-2 AB AG IA: CPT

## 2023-12-05 PROCEDURE — 86780 TREPONEMA PALLIDUM: CPT

## 2023-12-05 PROCEDURE — 86850 RBC ANTIBODY SCREEN: CPT

## 2023-12-20 ENCOUNTER — ROUTINE PRENATAL (OUTPATIENT)
Dept: OBSTETRICS AND GYNECOLOGY | Facility: CLINIC | Age: 28
End: 2023-12-20
Payer: COMMERCIAL

## 2023-12-20 VITALS — DIASTOLIC BLOOD PRESSURE: 80 MMHG | WEIGHT: 213 LBS | BODY MASS INDEX: 37.73 KG/M2 | SYSTOLIC BLOOD PRESSURE: 124 MMHG

## 2023-12-20 DIAGNOSIS — Z34.80 ENCOUNTER FOR SUPERVISION OF NORMAL PREGNANCY IN MULTIGRAVIDA (HHS-HCC): Primary | ICD-10-CM

## 2023-12-20 DIAGNOSIS — Z3A.13 13 WEEKS GESTATION OF PREGNANCY (HHS-HCC): ICD-10-CM

## 2023-12-20 PROCEDURE — 99213 OFFICE O/P EST LOW 20 MIN: CPT | Performed by: ADVANCED PRACTICE MIDWIFE

## 2023-12-20 NOTE — PROGRESS NOTES
Return OB visit    S: Paula Vasquez is a 28 y.o.  at 13w2d with a working estimated date of delivery of 2024, by Last Menstrual Period who presents for a routine prenatal visit. She denies vaginal bleeding, abdominal pain, leakage of fluid.     Concerns today: Patient has no concerns.    Bertha Fields MA    O: See prenatal flow sheet    A/P:  Lab results reviewed WNL.  Reviewed warning signs and when to call midwife  Follow up in *** weeks for a routine prenatal visit  beatris

## 2023-12-20 NOTE — PROGRESS NOTES
Return OB visit    S: Paula Vasquez is a 28 y.o.  at 13w2d with a working estimated date of delivery of 2024, by Last Menstrual Period who presents for a routine prenatal visit. She denies vaginal bleeding, abdominal pain, leakage of fluid.     Concerns today: Patient has no concerns.    Betrha Fields MA    O: See prenatal flow sheet    A/P:  Lab results reviewed WNL. rrNIPS.  Not candidate for ASA initiation  Reviewed warning signs and when to call provider  Follow up in 4 weeks for a routine prenatal visit    Next visit review NT magy  Needs OB physical

## 2023-12-21 ENCOUNTER — ANCILLARY PROCEDURE (OUTPATIENT)
Dept: RADIOLOGY | Facility: CLINIC | Age: 28
End: 2023-12-21
Payer: COMMERCIAL

## 2023-12-21 DIAGNOSIS — Z34.80 ENCOUNTER FOR SUPERVISION OF NORMAL PREGNANCY IN MULTIGRAVIDA (HHS-HCC): ICD-10-CM

## 2023-12-21 PROCEDURE — 76801 OB US < 14 WKS SINGLE FETUS: CPT

## 2023-12-21 PROCEDURE — 76815 OB US LIMITED FETUS(S): CPT | Performed by: OBSTETRICS & GYNECOLOGY

## 2024-01-29 ENCOUNTER — APPOINTMENT (OUTPATIENT)
Dept: OBSTETRICS AND GYNECOLOGY | Facility: CLINIC | Age: 29
End: 2024-01-29
Payer: COMMERCIAL

## 2024-02-05 ENCOUNTER — APPOINTMENT (OUTPATIENT)
Dept: RADIOLOGY | Facility: CLINIC | Age: 29
End: 2024-02-05
Payer: COMMERCIAL

## 2024-02-05 ENCOUNTER — HOSPITAL ENCOUNTER (OUTPATIENT)
Dept: RADIOLOGY | Facility: CLINIC | Age: 29
Discharge: HOME | End: 2024-02-05
Payer: COMMERCIAL

## 2024-02-05 DIAGNOSIS — Z34.80 ENCOUNTER FOR SUPERVISION OF NORMAL PREGNANCY IN MULTIGRAVIDA (HHS-HCC): ICD-10-CM

## 2024-02-05 PROCEDURE — 76811 OB US DETAILED SNGL FETUS: CPT | Performed by: OBSTETRICS & GYNECOLOGY

## 2024-02-05 PROCEDURE — 76811 OB US DETAILED SNGL FETUS: CPT

## 2024-02-14 ENCOUNTER — ROUTINE PRENATAL (OUTPATIENT)
Dept: OBSTETRICS AND GYNECOLOGY | Facility: CLINIC | Age: 29
End: 2024-02-14
Payer: COMMERCIAL

## 2024-02-14 VITALS — WEIGHT: 213.38 LBS | SYSTOLIC BLOOD PRESSURE: 100 MMHG | BODY MASS INDEX: 37.8 KG/M2 | DIASTOLIC BLOOD PRESSURE: 60 MMHG

## 2024-02-14 DIAGNOSIS — Z3A.21 21 WEEKS GESTATION OF PREGNANCY (HHS-HCC): Primary | ICD-10-CM

## 2024-02-14 DIAGNOSIS — Z34.80 SUPERVISION OF OTHER NORMAL PREGNANCY, ANTEPARTUM (HHS-HCC): ICD-10-CM

## 2024-02-14 DIAGNOSIS — Z12.4 CERVICAL CANCER SCREENING: ICD-10-CM

## 2024-02-14 PROBLEM — O99.210 OBESITY IN PREGNANCY (HHS-HCC): Status: ACTIVE | Noted: 2024-02-14

## 2024-02-14 PROCEDURE — 88175 CYTOPATH C/V AUTO FLUID REDO: CPT

## 2024-02-14 PROCEDURE — 99213 OFFICE O/P EST LOW 20 MIN: CPT

## 2024-02-14 PROCEDURE — 88141 CYTOPATH C/V INTERPRET: CPT | Performed by: PATHOLOGY

## 2024-02-14 NOTE — PROGRESS NOTES
Patient presents today for OBFU & physical exam.  Patient has no concerns or questions at this time.    STEVEN LUCAS MA    Subjective     Paula Vasquez is a 28 y.o.  at 21w2d with a working estimated date of delivery of 2024, by Last Menstrual Period who presents for a routine prenatal visit. She denies vaginal bleeding, leakage of fluid, or contractions. Patient reports feeling fetal movement and compliance with PNV.     Reviewed anatomy scan heart and face sub visualized. Repeat scan scheduled.   Current Outpatient Medications on File Prior to Visit   Medication Sig Dispense Refill    famotidine (Pepcid) 20 mg tablet Take 1 tablet (20 mg) by mouth once daily. 90 tablet 3    prenatal vitamin, iron-folic, (prenatal vit no.130-iron-folic) 27 mg iron-800 mcg folic acid tablet Take 1 tablet by mouth once daily. 30 tablet 11     No current facility-administered medications on file prior to visit.        Her pregnancy is complicated by:  Medical Problems       Problem List       Supervision of other normal pregnancy, antepartum    Overview Signed 2023 12:44 PM by Sade Olivia MD     -declines flu and covid vaccines  -NIPS, ob labs and A1c in 2 wks                Objective   Weight: 96.8 kg (213 lb 6 oz)  Expected Total Weight Gain: 5 kg (11 lb)-9 kg (19 lb)   TWG: -3.005 kg (-6 lb 10 oz)  Pregravid BMI: 38.98      BP: 100/60          Prenatal Labs:  Lab Results   Component Value Date    HGB 12.2 2023    HCT 38.0 2023     2023    ABO O 2023    LABRH POS 2023    NEISSGONOAMP Negative 2023    CHLAMTRACAMP Negative 2023    SYPHT Nonreactive 2023    HEPBSAG Nonreactive 2023    HIV1X2 Nonreactive 2023    URINECULTURE No significant growth 2023       Assessment/Plan   PE and pap today. No hx of abnormals, if current pap is normal, next pap will be in 3 years  Review completed anatomy scan  One hour and CBC with next  visit  Reviewed s/sx of PTL, warning signs, and when to call provider  Follow up in 4 weeks for a routine prenatal visit.    NORA Woods-HAROLDO

## 2024-02-21 ENCOUNTER — APPOINTMENT (OUTPATIENT)
Dept: RADIOLOGY | Facility: CLINIC | Age: 29
End: 2024-02-21
Payer: COMMERCIAL

## 2024-02-26 ENCOUNTER — HOSPITAL ENCOUNTER (OUTPATIENT)
Dept: RADIOLOGY | Facility: CLINIC | Age: 29
Discharge: HOME | End: 2024-02-26
Payer: COMMERCIAL

## 2024-02-26 DIAGNOSIS — Z34.80 ENCOUNTER FOR SUPERVISION OF NORMAL PREGNANCY IN MULTIGRAVIDA (HHS-HCC): ICD-10-CM

## 2024-02-29 LAB
CYTOLOGY CMNT CVX/VAG CYTO-IMP: NORMAL
LAB AP HPV GENOTYPE QUESTION: YES
LAB AP HPV HR: NORMAL
LABORATORY COMMENT REPORT: NORMAL
PATH REPORT.TOTAL CANCER: NORMAL

## 2024-03-01 PROBLEM — R87.612 LOW GRADE SQUAMOUS INTRAEPITH LESION ON CYTOLOGIC SMEAR CERVIX (LGSIL): Status: ACTIVE | Noted: 2024-03-01

## 2024-03-06 ENCOUNTER — HOSPITAL ENCOUNTER (OUTPATIENT)
Dept: RADIOLOGY | Facility: CLINIC | Age: 29
Discharge: HOME | End: 2024-03-06

## 2024-03-06 DIAGNOSIS — Z34.80 ENCOUNTER FOR SUPERVISION OF NORMAL PREGNANCY IN MULTIGRAVIDA (HHS-HCC): ICD-10-CM

## 2024-03-06 PROCEDURE — 76816 OB US FOLLOW-UP PER FETUS: CPT

## 2024-03-06 PROCEDURE — 76816 OB US FOLLOW-UP PER FETUS: CPT | Performed by: OBSTETRICS & GYNECOLOGY

## 2024-03-13 ENCOUNTER — ROUTINE PRENATAL (OUTPATIENT)
Dept: OBSTETRICS AND GYNECOLOGY | Facility: CLINIC | Age: 29
End: 2024-03-13
Payer: COMMERCIAL

## 2024-03-13 VITALS — DIASTOLIC BLOOD PRESSURE: 58 MMHG | SYSTOLIC BLOOD PRESSURE: 120 MMHG | WEIGHT: 215.2 LBS | BODY MASS INDEX: 38.12 KG/M2

## 2024-03-13 DIAGNOSIS — Z34.80 SUPERVISION OF OTHER NORMAL PREGNANCY, ANTEPARTUM (HHS-HCC): ICD-10-CM

## 2024-03-13 DIAGNOSIS — Z3A.25 25 WEEKS GESTATION OF PREGNANCY (HHS-HCC): Primary | ICD-10-CM

## 2024-03-13 DIAGNOSIS — O99.210 OBESITY IN PREGNANCY (HHS-HCC): ICD-10-CM

## 2024-03-13 DIAGNOSIS — R87.612 LOW GRADE SQUAMOUS INTRAEPITH LESION ON CYTOLOGIC SMEAR CERVIX (LGSIL): ICD-10-CM

## 2024-03-13 LAB
ERYTHROCYTE [DISTWIDTH] IN BLOOD BY AUTOMATED COUNT: 14 % (ref 11.5–14.5)
FERRITIN SERPL-MCNC: 13 NG/ML
GLUCOSE 1H P 50 G GLC PO SERPL-MCNC: 152 MG/DL
HCT VFR BLD AUTO: 32.5 % (ref 36–46)
HGB BLD-MCNC: 10.2 G/DL (ref 12–16)
IRON SATN MFR SERPL: 10 %
IRON SERPL-MCNC: 41 UG/DL
MCH RBC QN AUTO: 26.9 PG (ref 26–34)
MCHC RBC AUTO-ENTMCNC: 31.4 G/DL (ref 32–36)
MCV RBC AUTO: 86 FL (ref 80–100)
NRBC BLD-RTO: 0 /100 WBCS (ref 0–0)
PLATELET # BLD AUTO: 319 X10*3/UL (ref 150–450)
RBC # BLD AUTO: 3.79 X10*6/UL (ref 4–5.2)
REFLEX ADDED, ANEMIA PANEL: NORMAL
TIBC SERPL-MCNC: 397 UG/DL
UIBC SERPL-MCNC: 356 UG/DL
WBC # BLD AUTO: 11.9 X10*3/UL (ref 4.4–11.3)

## 2024-03-13 PROCEDURE — 82607 VITAMIN B-12: CPT

## 2024-03-13 PROCEDURE — 82728 ASSAY OF FERRITIN: CPT

## 2024-03-13 PROCEDURE — 82746 ASSAY OF FOLIC ACID SERUM: CPT

## 2024-03-13 PROCEDURE — 85027 COMPLETE CBC AUTOMATED: CPT

## 2024-03-13 PROCEDURE — 83550 IRON BINDING TEST: CPT

## 2024-03-13 PROCEDURE — 82947 ASSAY GLUCOSE BLOOD QUANT: CPT

## 2024-03-13 PROCEDURE — 99213 OFFICE O/P EST LOW 20 MIN: CPT

## 2024-03-13 PROCEDURE — 36415 COLL VENOUS BLD VENIPUNCTURE: CPT

## 2024-03-13 NOTE — PROGRESS NOTES
Subjective     Paula Vasquez is a 28 y.o.  at 25w2d with a working estimated date of delivery of 2024, by Last Menstrual Period who presents for a routine prenatal visit. She denies vaginal bleeding, leakage of fluid, decreased fetal movements, or contractions.    Discussed LSIL pap with patient and need for colposcopy. Case discussed with Dr. Olivia who reports that she will perform colpo for patient at a future [facility]. Patient education sent though my chart on regards to colpo.    Reviewed completed anatomy scan-within normal limits.    Patient overall feeling well and has no concerns at this time.     Her pregnancy is complicated by:  Medical Problems       Problem List       Supervision of other normal pregnancy, antepartum    Overview Signed 2023 12:44 PM by Sade Olivia MD     -declines flu and covid vaccines  -NIPS, ob labs and A1c in 2 wks         Obesity in pregnancy    Overview Signed 2024  2:51 PM by MAYELA Woods     Initial BMI 39  Growth US at 30 and 36 weeks  NST/BPP weekly beginning at 37 weeks  Recommendation for IOL b/w 39.0-39.6            Low grade squamous intraepith lesion on cytologic smear cervix (lgsil)    Overview Signed 3/1/2024  1:30 PM by MAYELA Woods     Pap 2024 LSIL               Objective   Weight: 97.6 kg (215 lb 3.2 oz)  TWG: -2.177 kg (-4 lb 12.8 oz)  Pregravid BMI: 38.98    BP: 120/58          Prenatal Labs:  Lab Results   Component Value Date    HGB 12.2 2023    HCT 38.0 2023     2023    ABO O 2023    LABRH POS 2023    NEISSGONOAMP Negative 2023    CHLAMTRACAMP Negative 2023    SYPHT Nonreactive 2023    HEPBSAG Nonreactive 2023    HIV1X2 Nonreactive 2023    URINECULTURE No significant growth 2023       Assessment/Plan   Tdap at next visit.   Patient to follow up with Dr. Olivia for colpo for LSIL pap  Reviewed s/sx of PTL, warning signs, fetal movement  counts, and when to call provider  Follow up in 3 week for a routine prenatal visit.    NORA Woods-GUTIERREZM

## 2024-03-14 LAB
FOLATE SERPL-MCNC: >24 NG/ML
VIT B12 SERPL-MCNC: 167 PG/ML

## 2024-03-15 DIAGNOSIS — O99.012 ANEMIA AFFECTING PREGNANCY IN SECOND TRIMESTER (HHS-HCC): ICD-10-CM

## 2024-03-15 DIAGNOSIS — R73.09 ELEVATED GLUCOSE TOLERANCE TEST: ICD-10-CM

## 2024-03-15 RX ORDER — DOCUSATE SODIUM 100 MG/1
100 CAPSULE, LIQUID FILLED ORAL 2 TIMES DAILY
Qty: 180 CAPSULE | Refills: 1 | Status: SHIPPED | OUTPATIENT
Start: 2024-03-15

## 2024-03-15 RX ORDER — FERROUS SULFATE 325(65) MG
TABLET, DELAYED RELEASE (ENTERIC COATED) ORAL
Qty: 90 TABLET | Refills: 2 | Status: SHIPPED | OUTPATIENT
Start: 2024-03-15

## 2024-04-08 ENCOUNTER — LAB (OUTPATIENT)
Dept: LAB | Facility: LAB | Age: 29
End: 2024-04-08
Payer: COMMERCIAL

## 2024-04-08 DIAGNOSIS — R73.09 ELEVATED GLUCOSE TOLERANCE TEST: ICD-10-CM

## 2024-04-08 LAB
GLUCOSE 1H P 100 G GLC PO SERPL-MCNC: 173 MG/DL
GLUCOSE 2H P 100 G GLC PO SERPL-MCNC: 156 MG/DL
GLUCOSE 3H P 100 G GLC PO SERPL-MCNC: 131 MG/DL
GLUCOSE P FAST SERPL-MCNC: 87 MG/DL

## 2024-04-08 PROCEDURE — 82947 ASSAY GLUCOSE BLOOD QUANT: CPT

## 2024-04-08 PROCEDURE — 82952 GTT-ADDED SAMPLES: CPT

## 2024-04-08 PROCEDURE — 82950 GLUCOSE TEST: CPT

## 2024-04-08 PROCEDURE — 82951 GLUCOSE TOLERANCE TEST (GTT): CPT

## 2024-04-08 PROCEDURE — 36415 COLL VENOUS BLD VENIPUNCTURE: CPT

## 2024-04-09 ENCOUNTER — PROCEDURE VISIT (OUTPATIENT)
Dept: OBSTETRICS AND GYNECOLOGY | Facility: CLINIC | Age: 29
End: 2024-04-09
Payer: COMMERCIAL

## 2024-04-09 DIAGNOSIS — Z3A.29 29 WEEKS GESTATION OF PREGNANCY (HHS-HCC): ICD-10-CM

## 2024-04-09 DIAGNOSIS — O99.012 ANEMIA AFFECTING PREGNANCY IN SECOND TRIMESTER (HHS-HCC): ICD-10-CM

## 2024-04-09 DIAGNOSIS — O99.210 OBESITY IN PREGNANCY (HHS-HCC): ICD-10-CM

## 2024-04-09 DIAGNOSIS — Z34.80 SUPERVISION OF OTHER NORMAL PREGNANCY, ANTEPARTUM (HHS-HCC): ICD-10-CM

## 2024-04-15 ENCOUNTER — HOSPITAL ENCOUNTER (OUTPATIENT)
Dept: RADIOLOGY | Facility: CLINIC | Age: 29
Discharge: HOME | End: 2024-04-15
Payer: COMMERCIAL

## 2024-04-15 DIAGNOSIS — Z34.80 ENCOUNTER FOR SUPERVISION OF NORMAL PREGNANCY IN MULTIGRAVIDA (HHS-HCC): ICD-10-CM

## 2024-04-15 DIAGNOSIS — O99.213 OBESITY COMPLICATING PREGNANCY, THIRD TRIMESTER (HHS-HCC): ICD-10-CM

## 2024-04-15 PROBLEM — O40.3XX0 POLYHYDRAMNIOS IN THIRD TRIMESTER (HHS-HCC): Status: ACTIVE | Noted: 2024-04-15

## 2024-04-15 PROCEDURE — 76819 FETAL BIOPHYS PROFIL W/O NST: CPT

## 2024-04-15 PROCEDURE — 76816 OB US FOLLOW-UP PER FETUS: CPT

## 2024-04-22 ENCOUNTER — HOSPITAL ENCOUNTER (OUTPATIENT)
Dept: RADIOLOGY | Facility: CLINIC | Age: 29
Discharge: HOME | End: 2024-04-22
Payer: COMMERCIAL

## 2024-04-22 DIAGNOSIS — Z34.80 ENCOUNTER FOR SUPERVISION OF NORMAL PREGNANCY IN MULTIGRAVIDA (HHS-HCC): ICD-10-CM

## 2024-04-22 PROCEDURE — 76819 FETAL BIOPHYS PROFIL W/O NST: CPT | Performed by: OBSTETRICS & GYNECOLOGY

## 2024-04-22 PROCEDURE — 76819 FETAL BIOPHYS PROFIL W/O NST: CPT

## 2024-04-30 ENCOUNTER — PROCEDURE VISIT (OUTPATIENT)
Dept: OBSTETRICS AND GYNECOLOGY | Facility: CLINIC | Age: 29
End: 2024-04-30
Payer: COMMERCIAL

## 2024-04-30 VITALS
DIASTOLIC BLOOD PRESSURE: 82 MMHG | BODY MASS INDEX: 38.48 KG/M2 | WEIGHT: 217.25 LBS | SYSTOLIC BLOOD PRESSURE: 122 MMHG

## 2024-04-30 DIAGNOSIS — Z34.80 SUPERVISION OF OTHER NORMAL PREGNANCY, ANTEPARTUM (HHS-HCC): ICD-10-CM

## 2024-04-30 DIAGNOSIS — O99.012 ANEMIA AFFECTING PREGNANCY IN SECOND TRIMESTER (HHS-HCC): ICD-10-CM

## 2024-04-30 DIAGNOSIS — O40.3XX1 POLYHYDRAMNIOS IN THIRD TRIMESTER COMPLICATION, FETUS 1 OF MULTIPLE GESTATION (HHS-HCC): ICD-10-CM

## 2024-04-30 DIAGNOSIS — Z3A.32 32 WEEKS GESTATION OF PREGNANCY (HHS-HCC): ICD-10-CM

## 2024-04-30 DIAGNOSIS — O99.210 OBESITY IN PREGNANCY (HHS-HCC): ICD-10-CM

## 2024-04-30 PROCEDURE — 90715 TDAP VACCINE 7 YRS/> IM: CPT | Performed by: OBSTETRICS & GYNECOLOGY

## 2024-04-30 PROCEDURE — 90471 IMMUNIZATION ADMIN: CPT | Performed by: OBSTETRICS & GYNECOLOGY

## 2024-04-30 PROCEDURE — 99213 OFFICE O/P EST LOW 20 MIN: CPT | Performed by: OBSTETRICS & GYNECOLOGY

## 2024-04-30 NOTE — PROGRESS NOTES
Routine ob at 32.1 wks.  Feeling well.  Good FM.  Tdap today.  Decided to do colpo postpartum as pap is LSIL, she is 32 wks and will need a postpartum colpo anyway.   Has 36 wk growth scheduled and to follow up polyhydramnios.  Wants to be a doctor patient, will make her remaining appointments with the physicians.

## 2024-05-28 ENCOUNTER — ROUTINE PRENATAL (OUTPATIENT)
Dept: OBSTETRICS AND GYNECOLOGY | Facility: CLINIC | Age: 29
End: 2024-05-28
Payer: COMMERCIAL

## 2024-05-28 VITALS — BODY MASS INDEX: 39.25 KG/M2 | SYSTOLIC BLOOD PRESSURE: 118 MMHG | WEIGHT: 221.6 LBS | DIASTOLIC BLOOD PRESSURE: 85 MMHG

## 2024-05-28 DIAGNOSIS — O99.012 ANEMIA AFFECTING PREGNANCY IN SECOND TRIMESTER (HHS-HCC): ICD-10-CM

## 2024-05-28 DIAGNOSIS — O40.3XX0 POLYHYDRAMNIOS IN THIRD TRIMESTER COMPLICATION, SINGLE OR UNSPECIFIED FETUS (HHS-HCC): ICD-10-CM

## 2024-05-28 DIAGNOSIS — O99.210 OBESITY IN PREGNANCY (HHS-HCC): ICD-10-CM

## 2024-05-28 DIAGNOSIS — Z34.80 SUPERVISION OF OTHER NORMAL PREGNANCY, ANTEPARTUM (HHS-HCC): Primary | ICD-10-CM

## 2024-05-28 DIAGNOSIS — Z3A.36 36 WEEKS GESTATION OF PREGNANCY (HHS-HCC): ICD-10-CM

## 2024-05-28 LAB
ERYTHROCYTE [DISTWIDTH] IN BLOOD BY AUTOMATED COUNT: 14.6 % (ref 11.5–14.5)
FERRITIN SERPL-MCNC: 14 NG/ML (ref 8–150)
HCT VFR BLD AUTO: 35.4 % (ref 36–46)
HGB BLD-MCNC: 11.2 G/DL (ref 12–16)
HGB RETIC QN: 28 PG (ref 28–38)
IMMATURE RETIC FRACTION: 11.4 %
MCH RBC QN AUTO: 25.8 PG (ref 26–34)
MCHC RBC AUTO-ENTMCNC: 31.6 G/DL (ref 32–36)
MCV RBC AUTO: 82 FL (ref 80–100)
NRBC BLD-RTO: 0 /100 WBCS (ref 0–0)
PLATELET # BLD AUTO: 283 X10*3/UL (ref 150–450)
RBC # BLD AUTO: 4.34 X10*6/UL (ref 4–5.2)
RETICS #: 0.06 X10*6/UL (ref 0.02–0.08)
RETICS/RBC NFR AUTO: 1.4 % (ref 0.5–2)
WBC # BLD AUTO: 11.5 X10*3/UL (ref 4.4–11.3)

## 2024-05-28 PROCEDURE — 85045 AUTOMATED RETICULOCYTE COUNT: CPT

## 2024-05-28 PROCEDURE — 99213 OFFICE O/P EST LOW 20 MIN: CPT | Performed by: OBSTETRICS & GYNECOLOGY

## 2024-05-28 PROCEDURE — 82728 ASSAY OF FERRITIN: CPT

## 2024-05-28 PROCEDURE — 87081 CULTURE SCREEN ONLY: CPT

## 2024-05-28 PROCEDURE — 36415 COLL VENOUS BLD VENIPUNCTURE: CPT

## 2024-05-28 PROCEDURE — 85027 COMPLETE CBC AUTOMATED: CPT

## 2024-05-28 NOTE — PROGRESS NOTES
Growth scan on 5/30/24  GBS today, NKDA  C/O: None    Miya Duckworth, MA II    Routine prenatal visit   Doing well - no complaints.  Has occasional discomfort with fetal movements but does not think she is having contractions.  Has growth USN/fluid check later this week - moderate poly noted on last USN at 31 weeks. GBS done today.  Taking oral iron - takes most days.  Last hgb was 10.2 on 3/13.  Will repeat anemia labs today.     Medical Problems       Problem List       Supervision of other normal pregnancy, antepartum ([facility])    Overview Addendum 5/28/2024  9:45 AM by Preeti Xiong MD     - Elevated glucola but normal 3 hour GTT          Obesity in pregnancy ([facility])    Overview Addendum 5/28/2024  9:47 AM by Preeti Xiong MD     - BMI 39 at first visit   - 31 week growth - EFW 41%  <> 36 week growth   <> weekly NSTs 36 weeks   <> IOL b/w 39.0-39.6            Low grade squamous intraepith lesion on cytologic smear cervix (lgsil)    Overview Signed 3/1/2024  1:30 PM by MAYELA Woods     Pap 2/2024 LSIL         Anemia affecting pregnancy in second trimester ([facility])    Overview Signed 3/15/2024  1:42 PM by MAYELA Woods     ANUSHA: 3/14/24: H&H 10.2/32.5, MCV 86, TIBC 397, ferritin 13.  Prescription for iron sent to pharmacy.  Repeat CBC with recheck after 2 weeks of compliance.         Polyhydramnios in third trimester ([facility])    Overview Addendum 5/28/2024  9:47 AM by Preeti Xiong MD     - moderate polyhydramnios at 31 weeks  <> 36 week USN              Follow up in 1 week(s).

## 2024-05-30 ENCOUNTER — HOSPITAL ENCOUNTER (OUTPATIENT)
Dept: RADIOLOGY | Facility: CLINIC | Age: 29
Discharge: HOME | End: 2024-05-30
Payer: COMMERCIAL

## 2024-05-30 DIAGNOSIS — O99.210 OBESITY AFFECTING PREGNANCY (HHS-HCC): ICD-10-CM

## 2024-05-30 DIAGNOSIS — Z34.80 ENCOUNTER FOR SUPERVISION OF NORMAL PREGNANCY IN MULTIGRAVIDA (HHS-HCC): ICD-10-CM

## 2024-05-30 PROCEDURE — 76819 FETAL BIOPHYS PROFIL W/O NST: CPT | Performed by: STUDENT IN AN ORGANIZED HEALTH CARE EDUCATION/TRAINING PROGRAM

## 2024-05-30 PROCEDURE — 76816 OB US FOLLOW-UP PER FETUS: CPT

## 2024-05-30 PROCEDURE — 76816 OB US FOLLOW-UP PER FETUS: CPT | Performed by: STUDENT IN AN ORGANIZED HEALTH CARE EDUCATION/TRAINING PROGRAM

## 2024-05-30 PROCEDURE — 76819 FETAL BIOPHYS PROFIL W/O NST: CPT

## 2024-05-31 PROBLEM — B95.1 POSITIVE GBS TEST: Status: ACTIVE | Noted: 2024-05-31

## 2024-05-31 LAB — GP B STREP GENITAL QL CULT: ABNORMAL

## 2024-06-03 ENCOUNTER — ROUTINE PRENATAL (OUTPATIENT)
Dept: OBSTETRICS AND GYNECOLOGY | Facility: CLINIC | Age: 29
End: 2024-06-03
Payer: COMMERCIAL

## 2024-06-03 VITALS — WEIGHT: 221.5 LBS | DIASTOLIC BLOOD PRESSURE: 86 MMHG | BODY MASS INDEX: 39.24 KG/M2 | SYSTOLIC BLOOD PRESSURE: 125 MMHG

## 2024-06-03 DIAGNOSIS — Z3A.37 37 WEEKS GESTATION OF PREGNANCY (HHS-HCC): Primary | ICD-10-CM

## 2024-06-03 PROCEDURE — 99213 OFFICE O/P EST LOW 20 MIN: CPT | Performed by: ADVANCED PRACTICE MIDWIFE

## 2024-06-03 NOTE — PROGRESS NOTES
Subjective     Paula Vasquez is a 29 y.o.  at 37w0d with a working estimated date of delivery of 2024, by Last Menstrual Period who presents for a routine prenatal visit. Endorses good fetal movement, denies vaginal bleeding, leakage of fluid, or regular contractions.    Reviewed GBS results and treating with PCN in labor.  Discussed delivery plans, declines IOL, hoping for spontaneous and unmedicated labor.     Her pregnancy is complicated by:  Pregnancy Problems (from 23 to present)       Problem Noted Resolved    Polyhydramnios in third trimester ([facility]) 4/15/2024 by Sade Olivia MD No    Priority:  Medium      Overview Addendum 2024  9:47 AM by Preeti Xiong MD     - moderate polyhydramnios at 31 weeks  <> 36 week USN          Anemia affecting pregnancy in second trimester ([facility]) 3/15/2024 by NORA Woods-HAROLDO No    Priority:  Medium      Overview Addendum 2024  2:57 PM by Preeti Xiong MD     - last hgb 11.2 on   - on oral iron          Obesity in pregnancy ([facility]) 2024 by NORA Woods-HAROLDO No    Priority:  Medium      Overview Addendum 2024  9:47 AM by Preeti Xiong MD     - BMI 39 at first visit   - 31 week growth - EFW 41%  <> 36 week growth   <> weekly NSTs 36 weeks   <> IOL b/w 39.0-39.6            Supervision of other normal pregnancy, antepartum ([facility]) 2023 by Sade Olivia MD No    Priority:  Medium      Overview Addendum 2024  9:45 AM by Preeti Xiong MD     - Elevated glucola but normal 3 hour GTT             Objective   Physical Exam:   Weight: 100 kg (221 lb 8 oz)  TW.68 kg (1 lb 8 oz)  Expected Total Weight Gain: 5 kg (11 lb)-9 kg (19 lb)   Pregravid BMI: 38.98  BP: 125/86  Fetal Heart Rate: 140 Fundal Height (cm): 37 cm             Postpartum Depression: Not on file        Prenatal Labs  Lab Results   Component Value Date    HGB 11.2 (L) 2024    HCT 35.4 (L) 2024      2024    ABO O 2023    LABRH POS 2023    NEISSGONOAMP Negative 2023    CHLAMTRACAMP Negative 2023    SYPHT Nonreactive 2023    HEPBSAG Nonreactive 2023    HIV1X2 Nonreactive 2023    URINECULTURE No significant growth 2023     Lab Results   Component Value Date    GLUC1P 152 (H) 2024     Group B Strep Screen   Date Value Ref Range Status   2024 (A)  Final    Isolated: Streptococcus agalactiae (Group B Streptococcus)        Assessment/Plan   29 y.o.  at 37w0d  Continue prenatal vitamins  GBS positive    Reviewed s/sx of labor, warning signs, fetal movement counts, and when to call provider    Follow up in 1 week(s) for a routine prenatal visit or sooner as needed.    NORA Chu-HAROLDO

## 2024-06-04 ENCOUNTER — APPOINTMENT (OUTPATIENT)
Dept: OBSTETRICS AND GYNECOLOGY | Facility: CLINIC | Age: 29
End: 2024-06-04
Payer: COMMERCIAL

## 2024-06-11 ENCOUNTER — ROUTINE PRENATAL (OUTPATIENT)
Dept: OBSTETRICS AND GYNECOLOGY | Facility: CLINIC | Age: 29
End: 2024-06-11
Payer: COMMERCIAL

## 2024-06-11 VITALS — WEIGHT: 223.2 LBS | BODY MASS INDEX: 39.54 KG/M2 | SYSTOLIC BLOOD PRESSURE: 125 MMHG | DIASTOLIC BLOOD PRESSURE: 88 MMHG

## 2024-06-11 DIAGNOSIS — E66.9 OBESITY, CLASS II, BMI 35-39.9: Primary | ICD-10-CM

## 2024-06-11 DIAGNOSIS — Z3A.38 38 WEEKS GESTATION OF PREGNANCY (HHS-HCC): ICD-10-CM

## 2024-06-11 DIAGNOSIS — Z36.89 NST (NON-STRESS TEST) REACTIVE (HHS-HCC): ICD-10-CM

## 2024-06-11 PROCEDURE — 99213 OFFICE O/P EST LOW 20 MIN: CPT | Performed by: ADVANCED PRACTICE MIDWIFE

## 2024-06-11 PROCEDURE — 59025 FETAL NON-STRESS TEST: CPT | Performed by: ADVANCED PRACTICE MIDWIFE

## 2024-06-11 NOTE — PROGRESS NOTES
Subjective     Paula Vasquez is a 29 y.o.  at 38w1d with a working estimated date of delivery of 2024, by Last Menstrual Period who presents for a routine prenatal visit. Endorses good fetal movement, denies vaginal bleeding, leakage of fluid, or regular contractions.    Continues to decline IOL for obesity, prefers spontaneous labor    Her pregnancy is complicated by:  Pregnancy Problems (from 23 to present)       Problem Noted Resolved    Polyhydramnios in third trimester ([facility]) 4/15/2024 by Sade Olivia MD No    Priority:  Medium      Overview Addendum 2024  9:47 AM by Preeti Xiong MD     - moderate polyhydramnios at 31 weeks  <> 36 week USN          Anemia affecting pregnancy in second trimester ([facility]) 3/15/2024 by MAYELA Woods No    Priority:  Medium      Overview Addendum 2024  2:57 PM by Preeti Xiong MD     - last hgb 11.2 on   - on oral iron          Obesity in pregnancy ([facility]) 2024 by NORA Woods-HAROLDO No    Priority:  Medium      Overview Addendum 2024  9:47 AM by Preeti Xiong MD     - BMI 39 at first visit   - 31 week growth - EFW 41%  <> 36 week growth   <> weekly NSTs 36 weeks   <> IOL b/w 39.0-39.6            Supervision of other normal pregnancy, antepartum ([facility]) 2023 by Sade Olivia MD No    Priority:  Medium      Overview Addendum 2024  9:45 AM by Preeti Xiong MD     - Elevated glucola but normal 3 hour GTT           Objective   Physical Exam:   Weight: 101 kg (223 lb 3.2 oz)  TW.452 kg (3 lb 3.2 oz)  Expected Total Weight Gain: 5 kg (11 lb)-9 kg (19 lb)   Pregravid BMI: 38.98  BP: 125/88  Fetal Heart Rate: 140 Fundal Height (cm): 38 cm Presentation: Vertex  Dilation: 2 Effacement (%): 20 Fetal Station: Ballotable    Postpartum Depression: Not on file        Prenatal Labs  Lab Results   Component Value Date    HGB 11.2 (L) 2024    HCT 35.4 (L) 2024      2024    ABO O 2023    LABRH POS 2023    NEISSGONOAMP Negative 2023    CHLAMTRACAMP Negative 2023    SYPHT Nonreactive 2023    HEPBSAG Nonreactive 2023    HIV1X2 Nonreactive 2023    URINECULTURE No significant growth 2023     Lab Results   Component Value Date    GLUC1P 152 (H) 2024     Group B Strep Screen   Date Value Ref Range Status   2024 (A)  Final    Isolated: Streptococcus agalactiae (Group B Streptococcus)        Assessment/Plan   29 y.o.  at 38w1d  Continue prenatal vitamins  GBS positive  Reactive NST    Reviewed s/sx of labor, warning signs, fetal movement counts, and when to call provider    Follow up in 1 week(s) for a routine prenatal visit or sooner as needed.    NORA Chu-HAROLDO

## 2024-06-11 NOTE — PROCEDURES
Paula Vasquez, a  at 38w1d with an ELMIRA of 2024, by Last Menstrual Period, was seen at [facility] for a nonstress test for obesity.    Non-Stress Test   Baseline Fetal Heart Rate for Non-Stress Test: 140 BPM  Variability in Waveform for Non-Stress Test: Moderate  Accelerations in Non-Stress Test: Yes, greater than/equal to 15 bpm  Decelerations in Non-Stress Test: None  Contractions in Non-Stress Test: Not present  Acoustic Stimulator for Non-Stress Test: No  Interpretation of Non-Stress Test   Interpretation of Non-Stress Test: Reactive         NORA Chu-HAROLDO

## 2024-06-12 ENCOUNTER — ANESTHESIA (OUTPATIENT)
Dept: OBSTETRICS AND GYNECOLOGY | Facility: HOSPITAL | Age: 29
End: 2024-06-12
Payer: COMMERCIAL

## 2024-06-12 ENCOUNTER — ANESTHESIA EVENT (OUTPATIENT)
Dept: OBSTETRICS AND GYNECOLOGY | Facility: HOSPITAL | Age: 29
End: 2024-06-12
Payer: COMMERCIAL

## 2024-06-12 ENCOUNTER — HOSPITAL ENCOUNTER (INPATIENT)
Facility: HOSPITAL | Age: 29
LOS: 2 days | Discharge: HOME | End: 2024-06-14
Attending: OBSTETRICS & GYNECOLOGY | Admitting: OBSTETRICS & GYNECOLOGY
Payer: COMMERCIAL

## 2024-06-12 DIAGNOSIS — R52 POSTPARTUM PAIN (HHS-HCC): Primary | ICD-10-CM

## 2024-06-12 PROBLEM — Z37.9 NORMAL LABOR (HHS-HCC): Status: ACTIVE | Noted: 2024-06-12

## 2024-06-12 LAB
ABO GROUP (TYPE) IN BLOOD: NORMAL
ALBUMIN SERPL BCP-MCNC: 3.2 G/DL (ref 3.4–5)
ALP SERPL-CCNC: 100 U/L (ref 33–110)
ALT SERPL W P-5'-P-CCNC: 8 U/L (ref 7–45)
ANION GAP SERPL CALC-SCNC: 14 MMOL/L (ref 10–20)
ANTIBODY SCREEN: NORMAL
AST SERPL W P-5'-P-CCNC: 11 U/L (ref 9–39)
BILIRUB SERPL-MCNC: 0.3 MG/DL (ref 0–1.2)
BUN SERPL-MCNC: 10 MG/DL (ref 6–23)
CALCIUM SERPL-MCNC: 9 MG/DL (ref 8.6–10.3)
CHLORIDE SERPL-SCNC: 102 MMOL/L (ref 98–107)
CO2 SERPL-SCNC: 23 MMOL/L (ref 21–32)
CREAT SERPL-MCNC: 0.54 MG/DL (ref 0.5–1.05)
EGFRCR SERPLBLD CKD-EPI 2021: >90 ML/MIN/1.73M*2
ERYTHROCYTE [DISTWIDTH] IN BLOOD BY AUTOMATED COUNT: 15.1 % (ref 11.5–14.5)
GLUCOSE SERPL-MCNC: 93 MG/DL (ref 74–99)
HCT VFR BLD AUTO: 33.3 % (ref 36–46)
HGB BLD-MCNC: 10.7 G/DL (ref 12–16)
LDH SERPL L TO P-CCNC: 123 U/L (ref 84–246)
MCH RBC QN AUTO: 26.3 PG (ref 26–34)
MCHC RBC AUTO-ENTMCNC: 32.1 G/DL (ref 32–36)
MCV RBC AUTO: 82 FL (ref 80–100)
NRBC BLD-RTO: 0 /100 WBCS (ref 0–0)
PLATELET # BLD AUTO: 242 X10*3/UL (ref 150–450)
POTASSIUM SERPL-SCNC: 4 MMOL/L (ref 3.5–5.3)
PROT SERPL-MCNC: 6.3 G/DL (ref 6.4–8.2)
RBC # BLD AUTO: 4.07 X10*6/UL (ref 4–5.2)
RH FACTOR (ANTIGEN D): NORMAL
SODIUM SERPL-SCNC: 135 MMOL/L (ref 136–145)
TREPONEMA PALLIDUM IGG+IGM AB [PRESENCE] IN SERUM OR PLASMA BY IMMUNOASSAY: NONREACTIVE
URATE SERPL-MCNC: 4.8 MG/DL (ref 2.3–6.7)
WBC # BLD AUTO: 13.4 X10*3/UL (ref 4.4–11.3)

## 2024-06-12 PROCEDURE — 2500000004 HC RX 250 GENERAL PHARMACY W/ HCPCS (ALT 636 FOR OP/ED): Performed by: OBSTETRICS & GYNECOLOGY

## 2024-06-12 PROCEDURE — 51702 INSERT TEMP BLADDER CATH: CPT

## 2024-06-12 PROCEDURE — 59409 OBSTETRICAL CARE: CPT | Performed by: OBSTETRICS & GYNECOLOGY

## 2024-06-12 PROCEDURE — 86780 TREPONEMA PALLIDUM: CPT | Mod: STJLAB | Performed by: OBSTETRICS & GYNECOLOGY

## 2024-06-12 PROCEDURE — 7210000002 HC LABOR PER HOUR

## 2024-06-12 PROCEDURE — 36415 COLL VENOUS BLD VENIPUNCTURE: CPT | Performed by: OBSTETRICS & GYNECOLOGY

## 2024-06-12 PROCEDURE — 84075 ASSAY ALKALINE PHOSPHATASE: CPT | Performed by: OBSTETRICS & GYNECOLOGY

## 2024-06-12 PROCEDURE — 0HQ9XZZ REPAIR PERINEUM SKIN, EXTERNAL APPROACH: ICD-10-PCS | Performed by: OBSTETRICS & GYNECOLOGY

## 2024-06-12 PROCEDURE — 59050 FETAL MONITOR W/REPORT: CPT

## 2024-06-12 PROCEDURE — 7100000016 HC LABOR RECOVERY PER HOUR

## 2024-06-12 PROCEDURE — 83615 LACTATE (LD) (LDH) ENZYME: CPT | Performed by: OBSTETRICS & GYNECOLOGY

## 2024-06-12 PROCEDURE — 84550 ASSAY OF BLOOD/URIC ACID: CPT | Performed by: OBSTETRICS & GYNECOLOGY

## 2024-06-12 PROCEDURE — 2500000001 HC RX 250 WO HCPCS SELF ADMINISTERED DRUGS (ALT 637 FOR MEDICARE OP): Performed by: OBSTETRICS & GYNECOLOGY

## 2024-06-12 PROCEDURE — 2500000004 HC RX 250 GENERAL PHARMACY W/ HCPCS (ALT 636 FOR OP/ED): Performed by: NURSE ANESTHETIST, CERTIFIED REGISTERED

## 2024-06-12 PROCEDURE — 85027 COMPLETE CBC AUTOMATED: CPT | Performed by: OBSTETRICS & GYNECOLOGY

## 2024-06-12 PROCEDURE — 86901 BLOOD TYPING SEROLOGIC RH(D): CPT | Performed by: OBSTETRICS & GYNECOLOGY

## 2024-06-12 PROCEDURE — 1220000001 HC OB SEMI-PRIVATE ROOM DAILY

## 2024-06-12 RX ORDER — ONDANSETRON HYDROCHLORIDE 2 MG/ML
4 INJECTION, SOLUTION INTRAVENOUS EVERY 6 HOURS PRN
Status: DISCONTINUED | OUTPATIENT
Start: 2024-06-12 | End: 2024-06-12 | Stop reason: HOSPADM

## 2024-06-12 RX ORDER — HYDRALAZINE HYDROCHLORIDE 20 MG/ML
5 INJECTION INTRAMUSCULAR; INTRAVENOUS ONCE AS NEEDED
Status: DISCONTINUED | OUTPATIENT
Start: 2024-06-12 | End: 2024-06-14 | Stop reason: HOSPADM

## 2024-06-12 RX ORDER — ONDANSETRON 4 MG/1
4 TABLET, FILM COATED ORAL EVERY 6 HOURS PRN
Status: DISCONTINUED | OUTPATIENT
Start: 2024-06-12 | End: 2024-06-14 | Stop reason: HOSPADM

## 2024-06-12 RX ORDER — CARBOPROST TROMETHAMINE 250 UG/ML
250 INJECTION, SOLUTION INTRAMUSCULAR ONCE AS NEEDED
Status: DISCONTINUED | OUTPATIENT
Start: 2024-06-12 | End: 2024-06-12 | Stop reason: HOSPADM

## 2024-06-12 RX ORDER — POLYETHYLENE GLYCOL 3350 17 G/17G
17 POWDER, FOR SOLUTION ORAL 2 TIMES DAILY PRN
Status: DISCONTINUED | OUTPATIENT
Start: 2024-06-12 | End: 2024-06-14 | Stop reason: HOSPADM

## 2024-06-12 RX ORDER — ONDANSETRON 4 MG/1
4 TABLET, FILM COATED ORAL EVERY 6 HOURS PRN
Status: DISCONTINUED | OUTPATIENT
Start: 2024-06-12 | End: 2024-06-12 | Stop reason: HOSPADM

## 2024-06-12 RX ORDER — ONDANSETRON HYDROCHLORIDE 2 MG/ML
4 INJECTION, SOLUTION INTRAVENOUS EVERY 6 HOURS PRN
Status: DISCONTINUED | OUTPATIENT
Start: 2024-06-12 | End: 2024-06-14 | Stop reason: HOSPADM

## 2024-06-12 RX ORDER — LOPERAMIDE HYDROCHLORIDE 2 MG/1
4 CAPSULE ORAL EVERY 2 HOUR PRN
Status: DISCONTINUED | OUTPATIENT
Start: 2024-06-12 | End: 2024-06-12 | Stop reason: HOSPADM

## 2024-06-12 RX ORDER — TERBUTALINE SULFATE 1 MG/ML
0.25 INJECTION SUBCUTANEOUS ONCE AS NEEDED
Status: DISCONTINUED | OUTPATIENT
Start: 2024-06-12 | End: 2024-06-12 | Stop reason: HOSPADM

## 2024-06-12 RX ORDER — METHYLERGONOVINE MALEATE 0.2 MG/ML
0.2 INJECTION INTRAVENOUS ONCE AS NEEDED
Status: DISCONTINUED | OUTPATIENT
Start: 2024-06-12 | End: 2024-06-14 | Stop reason: HOSPADM

## 2024-06-12 RX ORDER — SODIUM CHLORIDE, SODIUM LACTATE, POTASSIUM CHLORIDE, CALCIUM CHLORIDE 600; 310; 30; 20 MG/100ML; MG/100ML; MG/100ML; MG/100ML
125 INJECTION, SOLUTION INTRAVENOUS CONTINUOUS
Status: DISCONTINUED | OUTPATIENT
Start: 2024-06-12 | End: 2024-06-14

## 2024-06-12 RX ORDER — MISOPROSTOL 200 UG/1
800 TABLET ORAL ONCE AS NEEDED
Status: DISCONTINUED | OUTPATIENT
Start: 2024-06-12 | End: 2024-06-12 | Stop reason: HOSPADM

## 2024-06-12 RX ORDER — METHYLERGONOVINE MALEATE 0.2 MG/ML
0.2 INJECTION INTRAVENOUS ONCE AS NEEDED
Status: DISCONTINUED | OUTPATIENT
Start: 2024-06-12 | End: 2024-06-12 | Stop reason: HOSPADM

## 2024-06-12 RX ORDER — ACETAMINOPHEN 325 MG/1
975 TABLET ORAL EVERY 6 HOURS
Status: DISCONTINUED | OUTPATIENT
Start: 2024-06-12 | End: 2024-06-14 | Stop reason: HOSPADM

## 2024-06-12 RX ORDER — TRANEXAMIC ACID 100 MG/ML
1000 INJECTION, SOLUTION INTRAVENOUS ONCE AS NEEDED
Status: DISCONTINUED | OUTPATIENT
Start: 2024-06-12 | End: 2024-06-12 | Stop reason: HOSPADM

## 2024-06-12 RX ORDER — LIDOCAINE 560 MG/1
1 PATCH PERCUTANEOUS; TOPICAL; TRANSDERMAL
Status: DISCONTINUED | OUTPATIENT
Start: 2024-06-12 | End: 2024-06-14 | Stop reason: HOSPADM

## 2024-06-12 RX ORDER — PENICILLIN G 3000000 [IU]/50ML
3 INJECTION, SOLUTION INTRAVENOUS EVERY 4 HOURS
Status: DISCONTINUED | OUTPATIENT
Start: 2024-06-12 | End: 2024-06-12 | Stop reason: HOSPADM

## 2024-06-12 RX ORDER — LABETALOL HYDROCHLORIDE 5 MG/ML
20 INJECTION, SOLUTION INTRAVENOUS ONCE AS NEEDED
Status: DISCONTINUED | OUTPATIENT
Start: 2024-06-12 | End: 2024-06-14 | Stop reason: HOSPADM

## 2024-06-12 RX ORDER — LABETALOL HYDROCHLORIDE 5 MG/ML
20 INJECTION, SOLUTION INTRAVENOUS ONCE AS NEEDED
Status: DISCONTINUED | OUTPATIENT
Start: 2024-06-12 | End: 2024-06-12 | Stop reason: HOSPADM

## 2024-06-12 RX ORDER — OXYTOCIN 10 [USP'U]/ML
10 INJECTION, SOLUTION INTRAMUSCULAR; INTRAVENOUS ONCE AS NEEDED
Status: DISCONTINUED | OUTPATIENT
Start: 2024-06-12 | End: 2024-06-14 | Stop reason: HOSPADM

## 2024-06-12 RX ORDER — LIDOCAINE HYDROCHLORIDE 10 MG/ML
30 INJECTION INFILTRATION; PERINEURAL ONCE AS NEEDED
Status: DISCONTINUED | OUTPATIENT
Start: 2024-06-12 | End: 2024-06-12 | Stop reason: HOSPADM

## 2024-06-12 RX ORDER — DIPHENHYDRAMINE HCL 25 MG
25 TABLET ORAL EVERY 6 HOURS PRN
Status: DISCONTINUED | OUTPATIENT
Start: 2024-06-12 | End: 2024-06-14 | Stop reason: HOSPADM

## 2024-06-12 RX ORDER — ADHESIVE BANDAGE
10 BANDAGE TOPICAL
Status: DISCONTINUED | OUTPATIENT
Start: 2024-06-12 | End: 2024-06-14 | Stop reason: HOSPADM

## 2024-06-12 RX ORDER — MISOPROSTOL 200 UG/1
800 TABLET ORAL ONCE AS NEEDED
Status: DISCONTINUED | OUTPATIENT
Start: 2024-06-12 | End: 2024-06-14 | Stop reason: HOSPADM

## 2024-06-12 RX ORDER — METOCLOPRAMIDE HYDROCHLORIDE 5 MG/ML
10 INJECTION INTRAMUSCULAR; INTRAVENOUS EVERY 6 HOURS PRN
Status: DISCONTINUED | OUTPATIENT
Start: 2024-06-12 | End: 2024-06-12 | Stop reason: HOSPADM

## 2024-06-12 RX ORDER — IBUPROFEN 600 MG/1
600 TABLET ORAL EVERY 6 HOURS
Status: DISCONTINUED | OUTPATIENT
Start: 2024-06-12 | End: 2024-06-14 | Stop reason: HOSPADM

## 2024-06-12 RX ORDER — OXYTOCIN 10 [USP'U]/ML
10 INJECTION, SOLUTION INTRAMUSCULAR; INTRAVENOUS ONCE AS NEEDED
Status: DISCONTINUED | OUTPATIENT
Start: 2024-06-12 | End: 2024-06-12 | Stop reason: HOSPADM

## 2024-06-12 RX ORDER — DIPHENHYDRAMINE HYDROCHLORIDE 50 MG/ML
25 INJECTION INTRAMUSCULAR; INTRAVENOUS EVERY 6 HOURS PRN
Status: DISCONTINUED | OUTPATIENT
Start: 2024-06-12 | End: 2024-06-14 | Stop reason: HOSPADM

## 2024-06-12 RX ORDER — OXYTOCIN/0.9 % SODIUM CHLORIDE 30/500 ML
60 PLASTIC BAG, INJECTION (ML) INTRAVENOUS ONCE AS NEEDED
Status: DISCONTINUED | OUTPATIENT
Start: 2024-06-12 | End: 2024-06-14 | Stop reason: HOSPADM

## 2024-06-12 RX ORDER — SIMETHICONE 80 MG
80 TABLET,CHEWABLE ORAL 4 TIMES DAILY PRN
Status: DISCONTINUED | OUTPATIENT
Start: 2024-06-12 | End: 2024-06-14 | Stop reason: HOSPADM

## 2024-06-12 RX ORDER — HYDRALAZINE HYDROCHLORIDE 20 MG/ML
5 INJECTION INTRAMUSCULAR; INTRAVENOUS ONCE AS NEEDED
Status: DISCONTINUED | OUTPATIENT
Start: 2024-06-12 | End: 2024-06-12 | Stop reason: HOSPADM

## 2024-06-12 RX ORDER — NIFEDIPINE 10 MG/1
10 CAPSULE ORAL ONCE AS NEEDED
Status: DISCONTINUED | OUTPATIENT
Start: 2024-06-12 | End: 2024-06-14 | Stop reason: HOSPADM

## 2024-06-12 RX ORDER — OXYTOCIN/0.9 % SODIUM CHLORIDE 30/500 ML
2-30 PLASTIC BAG, INJECTION (ML) INTRAVENOUS CONTINUOUS
Status: DISCONTINUED | OUTPATIENT
Start: 2024-06-12 | End: 2024-06-14

## 2024-06-12 RX ORDER — FENTANYL/ROPIVACAINE/NS/PF 2MCG/ML-.2
0-25 PLASTIC BAG, INJECTION (ML) INJECTION CONTINUOUS
Status: DISCONTINUED | OUTPATIENT
Start: 2024-06-12 | End: 2024-06-14

## 2024-06-12 RX ORDER — METOCLOPRAMIDE 10 MG/1
10 TABLET ORAL EVERY 6 HOURS PRN
Status: DISCONTINUED | OUTPATIENT
Start: 2024-06-12 | End: 2024-06-12 | Stop reason: HOSPADM

## 2024-06-12 RX ORDER — NIFEDIPINE 10 MG/1
10 CAPSULE ORAL ONCE AS NEEDED
Status: DISCONTINUED | OUTPATIENT
Start: 2024-06-12 | End: 2024-06-12 | Stop reason: HOSPADM

## 2024-06-12 RX ORDER — TRANEXAMIC ACID 100 MG/ML
1000 INJECTION, SOLUTION INTRAVENOUS ONCE AS NEEDED
Status: DISCONTINUED | OUTPATIENT
Start: 2024-06-12 | End: 2024-06-14 | Stop reason: HOSPADM

## 2024-06-12 RX ORDER — LOPERAMIDE HYDROCHLORIDE 2 MG/1
4 CAPSULE ORAL EVERY 2 HOUR PRN
Status: DISCONTINUED | OUTPATIENT
Start: 2024-06-12 | End: 2024-06-14 | Stop reason: HOSPADM

## 2024-06-12 RX ORDER — CARBOPROST TROMETHAMINE 250 UG/ML
250 INJECTION, SOLUTION INTRAMUSCULAR ONCE AS NEEDED
Status: DISCONTINUED | OUTPATIENT
Start: 2024-06-12 | End: 2024-06-14 | Stop reason: HOSPADM

## 2024-06-12 RX ORDER — OXYTOCIN/0.9 % SODIUM CHLORIDE 30/500 ML
60 PLASTIC BAG, INJECTION (ML) INTRAVENOUS ONCE AS NEEDED
Status: DISCONTINUED | OUTPATIENT
Start: 2024-06-12 | End: 2024-06-12 | Stop reason: HOSPADM

## 2024-06-12 RX ORDER — BISACODYL 10 MG/1
10 SUPPOSITORY RECTAL DAILY PRN
Status: DISCONTINUED | OUTPATIENT
Start: 2024-06-12 | End: 2024-06-14 | Stop reason: HOSPADM

## 2024-06-12 SDOH — ECONOMIC STABILITY: HOUSING INSECURITY: DO YOU FEEL UNSAFE GOING BACK TO THE PLACE WHERE YOU ARE LIVING?: NO

## 2024-06-12 SDOH — SOCIAL STABILITY: SOCIAL INSECURITY: HAVE YOU HAD ANY THOUGHTS OF HARMING ANYONE ELSE?: NO

## 2024-06-12 SDOH — SOCIAL STABILITY: SOCIAL INSECURITY: ABUSE SCREEN: ADULT

## 2024-06-12 SDOH — SOCIAL STABILITY: SOCIAL INSECURITY: DO YOU FEEL ANYONE HAS EXPLOITED OR TAKEN ADVANTAGE OF YOU FINANCIALLY OR OF YOUR PERSONAL PROPERTY?: NO

## 2024-06-12 SDOH — HEALTH STABILITY: MENTAL HEALTH: SUICIDAL BEHAVIOR (LIFETIME): NO

## 2024-06-12 SDOH — HEALTH STABILITY: MENTAL HEALTH: NON-SPECIFIC ACTIVE SUICIDAL THOUGHTS (PAST 1 MONTH): NO

## 2024-06-12 SDOH — SOCIAL STABILITY: SOCIAL INSECURITY: HAS ANYONE EVER THREATENED TO HURT YOUR FAMILY OR YOUR PETS?: NO

## 2024-06-12 SDOH — HEALTH STABILITY: MENTAL HEALTH: HAVE YOU USED ANY PRESCRIPTION DRUGS OTHER THAN PRESCRIBED IN THE PAST 12 MONTHS?: NO

## 2024-06-12 SDOH — HEALTH STABILITY: MENTAL HEALTH: WERE YOU ABLE TO COMPLETE ALL THE BEHAVIORAL HEALTH SCREENINGS?: YES

## 2024-06-12 SDOH — HEALTH STABILITY: MENTAL HEALTH: CURRENT SMOKER: 0

## 2024-06-12 SDOH — HEALTH STABILITY: MENTAL HEALTH: HAVE YOU USED ANY SUBSTANCES (CANABIS, COCAINE, HEROIN, HALLUCINOGENS, INHALANTS, ETC.) IN THE PAST 12 MONTHS?: NO

## 2024-06-12 SDOH — SOCIAL STABILITY: SOCIAL INSECURITY: PHYSICAL ABUSE: DENIES

## 2024-06-12 SDOH — SOCIAL STABILITY: SOCIAL INSECURITY: ARE YOU OR HAVE YOU BEEN THREATENED OR ABUSED PHYSICALLY, EMOTIONALLY, OR SEXUALLY BY ANYONE?: NO

## 2024-06-12 SDOH — SOCIAL STABILITY: SOCIAL INSECURITY: DOES ANYONE TRY TO KEEP YOU FROM HAVING/CONTACTING OTHER FRIENDS OR DOING THINGS OUTSIDE YOUR HOME?: NO

## 2024-06-12 SDOH — HEALTH STABILITY: MENTAL HEALTH: WISH TO BE DEAD (PAST 1 MONTH): NO

## 2024-06-12 SDOH — SOCIAL STABILITY: SOCIAL INSECURITY: VERBAL ABUSE: DENIES

## 2024-06-12 SDOH — SOCIAL STABILITY: SOCIAL INSECURITY: HAVE YOU HAD THOUGHTS OF HARMING ANYONE ELSE?: NO

## 2024-06-12 SDOH — SOCIAL STABILITY: SOCIAL INSECURITY: ARE THERE ANY APPARENT SIGNS OF INJURIES/BEHAVIORS THAT COULD BE RELATED TO ABUSE/NEGLECT?: NO

## 2024-06-12 ASSESSMENT — LIFESTYLE VARIABLES
HOW OFTEN DO YOU HAVE 6 OR MORE DRINKS ON ONE OCCASION: NEVER
AUDIT-C TOTAL SCORE: 0
AUDIT-C TOTAL SCORE: 0
HOW MANY STANDARD DRINKS CONTAINING ALCOHOL DO YOU HAVE ON A TYPICAL DAY: PATIENT DOES NOT DRINK
HOW OFTEN DO YOU HAVE A DRINK CONTAINING ALCOHOL: NEVER
SKIP TO QUESTIONS 9-10: 1

## 2024-06-12 ASSESSMENT — PAIN SCALES - GENERAL
PAINLEVEL_OUTOF10: 0 - NO PAIN
PAINLEVEL_OUTOF10: 7
PAIN_LEVEL: 0

## 2024-06-12 ASSESSMENT — ACTIVITIES OF DAILY LIVING (ADL): LACK_OF_TRANSPORTATION: NO

## 2024-06-12 ASSESSMENT — PATIENT HEALTH QUESTIONNAIRE - PHQ9
2. FEELING DOWN, DEPRESSED OR HOPELESS: NOT AT ALL
1. LITTLE INTEREST OR PLEASURE IN DOING THINGS: NOT AT ALL
SUM OF ALL RESPONSES TO PHQ9 QUESTIONS 1 & 2: 0

## 2024-06-12 NOTE — ANESTHESIA PREPROCEDURE EVALUATION
Patient: Paula Vasquez    Evaluation Method: In-person visit    Procedure Information    Date: 06/12/24  Procedure: Labor Analgesia         Relevant Problems   Endocrine   (+) Obesity in pregnancy ([facility])      Hematology   (+) Anemia affecting pregnancy in second trimester ([facility])      GYN   (+) Supervision of other normal pregnancy, antepartum ([facility])       Clinical information reviewed:    Allergies                NPO Detail:  NPO/Void Status  Date of Last Solid: 06/12/24  Time of Last Solid: 0000         OB/Gyn Evaluation    Present Pregnancy    Patient is pregnant now.   Obstetric History                Physical Exam    Airway  Mallampati: III     Cardiovascular - normal exam     Dental    Pulmonary - normal exam     Abdominal            Anesthesia Plan    History of general anesthesia?: yes  History of complications of general anesthesia?: no    ASA 2     epidural   (I informed and discussed the risks and benefits of general, spinal and epidural anesthesia with the patient.  The patient expressed her understanding and her questions were answered.  A verbal consent was given by the patient.  Patient has no history of problems with anesthesia  Patient has no family history of problems with anesthesia)  The patient is not a current smoker.    Anesthetic plan and risks discussed with patient.  Use of blood products discussed with patient who consented to blood products.

## 2024-06-12 NOTE — ANESTHESIA PROCEDURE NOTES
Epidural Block    Patient location during procedure: OB  Start time: 6/12/2024 6:45 AM  End time: 6/12/2024 6:57 AM  Reason for block: labor analgesia  Staffing  Performed: CRNA   Authorized by: GABRIELLE Sesay    Performed by: GABRIELLE Sesay    Preanesthetic Checklist  Completed: patient identified, IV checked, risks and benefits discussed, surgical consent, pre-op evaluation, timeout performed and sterile techniques followed  Block Timeout  RN/Licensed healthcare professional reads aloud to the Anesthesia provider and entire team: Patient identity, procedure with side and site, patient position, and as applicable the availability of implants/special equipment/special requirements.  Patient on coagulant treatment: no  Timeout performed at: 6/12/2024 6:45 AM  Block Placement  Patient position: sitting  Prep: ChloraPrep  Sterility prep: mask, hand, gloves, drape and cap  Sedation level: no sedation  Patient monitoring: heart rate, continuous pulse oximetry and blood pressure  Approach: midline  Local numbing: lidocaine 1% to skin and subcutaneous tissues  Vertebral space: lumbar  Lumbar location: L1-L2  Epidural  Loss of resistance technique: saline  Guidance: landmark technique        Needle  Needle type: Tuohy   Needle gauge: 17  Needle length: 8.9cm  Catheter type: multi-orifice  Catheter size: 20 G  Catheter securement method: clear occlusive dressing and surgical tape    Test dose: lidocaine 1.5% with epinephrine 1-to-200,000  Test dose: lidocaine 1.5% with epinephrine 1-to-200,000  Test dose result: no positive test dose    PCEA  Medication concentration used: 0.2% Ropivacaine with 2 mcg/mL Fentanyl  Dose (mL): 5  Lockout (minutes): 20  1-Hour Limit (boluses/hr): 3  Basal Rate: 7        Assessment  Sensory level: T4  Block outcome: patient comfortable  Number of attempts: 1  Events: no positive test dose  Procedure assessment: patient tolerated procedure well with no immediate  complications

## 2024-06-12 NOTE — H&P
Obstetrical Admission History and Physical     Paula Vasquez is a 29 y.o.  at 38w2d. ELMIRA: 2024, by Last Menstrual Period. Estimated fetal weight: 8-4. She has had prenatal care with Dr. Olivia .    Chief Complaint: No chief complaint on file.    Assessment/Plan    GBS Positive - PCN  Pitocin prn  Epidural prn     Principal Problem:    Normal labor ([facility])      Pregnancy Problems (from 23 to present)       Problem Noted Resolved    Polyhydramnios in third trimester ([facility]) 4/15/2024 by Sade Olivia MD No    Priority:  Medium      Overview Addendum 2024  9:47 AM by Preeti Xiong MD     - moderate polyhydramnios at 31 weeks  <> 36 week USN          Anemia affecting pregnancy in second trimester ([facility]) 3/15/2024 by NORA Woods-HAROLDO No    Priority:  Medium      Overview Addendum 2024  2:57 PM by Preeti Xiong MD     - last hgb 11.2 on   - on oral iron          Obesity in pregnancy ([facility]) 2024 by NORA Woods-HAROLDO No    Priority:  Medium      Overview Addendum 2024  9:47 AM by Preeti Xiong MD     - BMI 39 at first visit   - 31 week growth - EFW 41%  <> 36 week growth   <> weekly NSTs 36 weeks   <> IOL b/w 39.0-39.6            Supervision of other normal pregnancy, antepartum ([facility]) 2023 by Sade Olivia MD No    Priority:  Medium      Overview Addendum 2024  9:45 AM by Preeti Xiong MD     - Elevated glucola but normal 3 hour GTT                Options for delivery have been discussed with the patient and she elects a vaginal delivery.  Labor has been discussed in detail. The risks, benefits, complications, alternatives, expected outcomes, potential problems during recuperation and recovery, and the risks of not performing the procedure were discussed with the patient. The patient stated understanding that the risks of delivery include, but are not limited to: death; reaction to medications; injury to  bowel, bladder, ureters, uterus, cervix, vagina, and other pelvic and abdominal structures, infection; blood loss and possible need for transfusion; and potential need for surgery, including hysterectomy. The risks of injury to the infant during delivery were also discussed. All questions were answered. The patient is wishing to continue with plans for a vaginal delivery.    Admit to inpatient status. I anticipate that this patient will require a stay exceeding at least 2 midnights for delivery and postpartum.  Active management of rupture of membranes.  Management of pregnancy complications, as indicated.    Cy Mitchell presents to Labor & Delivery with Spontaneous Rupture of Membranes of clear  fluid at  0100 hr on 24 . Good fetal movement. Denies vaginal bleeding., Denies contractions.          Obstetrical History   OB History    Para Term  AB Living   3 1 1   1 1   SAB IAB Ectopic Multiple Live Births   1       1      # Outcome Date GA Lbr Sascha/2nd Weight Sex Delivery Anes PTL Lv   3 Current            2 Term 22 39w0d  4.026 kg F Vag-Spont   KELLY   1 SAB  16w0d              Past Medical History  Past Medical History:   Diagnosis Date    Encounter for gynecological examination (general) (routine) without abnormal findings     Well woman exam with routine gynecological exam    Twin pregnancy, monochorionic/diamniotic, second trimester ([facility]) 2021    Monochorionic diamniotic twin gestation in second trimester        Past Surgical History   Past Surgical History:   Procedure Laterality Date    OTHER SURGICAL HISTORY  2021    Dilation and evacuation       Social History  Social History     Tobacco Use    Smoking status: Never    Smokeless tobacco: Never   Substance Use Topics    Alcohol use: Not Currently     Substance and Sexual Activity   Drug Use Never       Allergies  Patient has no known allergies.     Medications  Medications Prior to Admission   Medication Sig  Dispense Refill Last Dose    docusate sodium (Colace) 100 mg capsule Take 1 capsule (100 mg) by mouth 2 times a day. 180 capsule 1 6/11/2024    famotidine (Pepcid) 20 mg tablet Take 1 tablet (20 mg) by mouth once daily. 90 tablet 3 6/11/2024    ferrous sulfate 325 (65 Fe) MG EC tablet Take one tablet twice daily every other day. Do not crush, chew, or split. 90 tablet 2 6/11/2024    prenatal vitamin, iron-folic, (prenatal vit no.130-iron-folic) 27 mg iron-800 mcg folic acid tablet Take 1 tablet by mouth once daily. 30 tablet 11 6/11/2024       Objective    Last Vitals  Temp Pulse Resp BP MAP O2 Sat   36.8 °C (98.2 °F) 93 18 (!) 141/94   (!) 95 %     Physical Examination  GENERAL: Examination reveals a well developed, well nourished, gravid female in no acute distress. She is alert and cooperative.  LUNGS: clear to auscultation bilaterally  HEART: regular rate and rhythm, S1, S2 normal, no murmur, click, rub or gallop  ABDOMEN: soft, gravid, nontender, nondistended, no abnormal masses, no epigastric pain  EXTREMITIES: no redness or tenderness in the calves or thighs, no edema  SKIN: normal coloration and turgor, no rashes  NEUROLOGICAL: alert, oriented, normal speech, no focal findings or movement disorder noted  PSYCHOLOGICAL: awake and alert; oriented to person, place, and time    Lab Review  Labs in chart were reviewed.

## 2024-06-12 NOTE — L&D DELIVERY NOTE
OB Delivery Note  2024  Paula Vasquez  29 y.o.   Vaginal, Spontaneous        Gestational Age: 38w2d  /Para:   Quantitative Blood Loss: Admission to Discharge: 328 mL (2024  2:57 AM - 2024  1:56 PM)    Francesca Vasquez [[id number]]      Labor Events    Rupture date/time: 2024 010  Rupture type: Spontaneous  Fluid color: Clear  Fluid odor: None  Labor type: Spontaneous Onset of Labor  Labor allowed to proceed with plans for an attempted vaginal birth?: Yes  Augmentation: Oxytocin  Augmentation date/time: 2024 0500  Augmentation indications: Prolonged ROM  Complications: None       Labor Event Times    Labor onset date/time: 2024  Dilation complete date/time: 2024  Start pushing date/time: 2024 0809       Labor Length    1st stage: 7h 05m  2nd stage: 0h 06m  3rd stage: 0h 04m       Placenta    Placenta delivery date/time: 202415  Placenta removal: Spontaneous  Placenta appearance: Intact  Placenta disposition: discarded       Cord    Vessels: 3 vessels  Complications: Nuchal  Nuchal intervention: reduced  Nuchal cord description: loose nuchal cord  Number of loops: 1  Delayed cord clamping?: Yes  Cord clamped date/time: 2024  Cord blood disposition: Lab  Gases sent?: No       Lacerations    Episiotomy: None  Perineal laceration: 1st  Perineal laceration repaired?: Yes  Repair suture: 3-0 Synthetic Suture       Anesthesia    Method: Epidural       Operative Delivery    Forceps attempted?: No  Vacuum extractor attempted?: No       Shoulder Dystocia    Shoulder dystocia present?: No       [place] Delivery    Time head delivered: 2024 08:11:00  Birth date/time: 2024 08:11:00  Delivery type: Vaginal, Spontaneous  Complications: None       Resuscitation    Method: Tactile stimulation       Apgars    Living status: Living  Apgar Component Scores:  1 min.:  5 min.:  10 min.:  15 min.:  20 min.:    Skin color:  1  1       Heart rate:   2  2       Reflex irritability:  2  2       Muscle tone:  2  2       Respiratory effort:  2  2       Total:  9  9       Apgars assigned by: MATIAS       Delivery Providers    Delivering clinician: Preeti Xiong MD   Provider Role    Grecia Barry RN Delivery Nurse    Avril Mary RN Nursery Nurse     Resident               Pt progressed quickly from 5 cm to 10 cm and felt strong urge to push. She pushed with 2 contractions which resulted in delivery of the fetal vertex over an intact perineum.  A nuchal cord x 1 was easily reduced.  The anterior and posterior shoulders were delivered without difficulty followed by the remainder of the infant.  The infant's cord was clamped and cut and the infant was placed on the patient's chest.  The placenta delivered spontaneously and was intact on inspection.  First degree perineal laceration was repaired with 3.0 vicryl.       Preeti Xiong MD

## 2024-06-12 NOTE — ANESTHESIA PREPROCEDURE EVALUATION
Patient: Paula Vasquez    Evaluation Method: In-person visit    Procedure Information    Date: 06/12/24  Procedure: Labor Consult         Relevant Problems   Endocrine   (+) Obesity in pregnancy ([facility])      Hematology   (+) Anemia affecting pregnancy in second trimester ([facility])      GYN   (+) Supervision of other normal pregnancy, antepartum ([facility])       Clinical information reviewed:    Allergies                NPO Detail:  NPO/Void Status  Date of Last Solid: 06/12/24  Time of Last Solid: 0000         OB/Gyn Evaluation    Present Pregnancy    Patient is pregnant now.   Obstetric History                Physical Exam    Airway  Mallampati: III     Cardiovascular - normal exam     Dental    Pulmonary - normal exam     Abdominal            Anesthesia Plan    History of general anesthesia?: yes  History of complications of general anesthesia?: no    ASA 2   (I informed and discussed the risks and benefits of general, spinal and epidural anesthesia with the patient.  The patient expressed her understanding and her questions were answered.  A verbal consent was given by the patient.  Patient has no history of problems with anesthesia  Patient has no family history of problems with anesthesia)  The patient is not a current smoker.    Anesthetic plan and risks discussed with patient.  Use of blood products discussed with patient who consented to blood products.

## 2024-06-12 NOTE — CARE PLAN
The patient's goals for the shift include      The clinical goals for the shift include      Problem: Antepartum  Goal: FHR remains reassuring  Outcome: Progressing     Problem: Antepartum  Goal: Minimize anxiety/maximize coping  Outcome: Progressing     Problem: Antepartum  Goal: Maintain pregnancy as long as maternal and/or fetal condition is stable  Outcome: Progressing

## 2024-06-12 NOTE — ANESTHESIA POSTPROCEDURE EVALUATION
"Patient: Paula Vasquez    Procedure Summary       Date: 24 Room / Location:     Anesthesia Start: 645 Anesthesia Stop: 811    Procedure: Labor Analgesia Diagnosis:     Scheduled Providers:  Responsible Provider: GABRIELLE Sesay    Anesthesia Type: epidural ASA Status: 2            Anesthesia Type: epidural     38w2d 29 y.o.     /78   Pulse 98   Temp 36.8 °C (98.2 °F) (Oral)   Resp 18   Ht 1.6 m (5' 3\")   Wt 102 kg (224 lb 10.4 oz)   LMP 2023   SpO2 96%   Breastfeeding No   BMI 39.79 kg/m²         Anesthesia Post Evaluation    Patient location during evaluation: bedside  Patient participation: complete - patient participated  Level of consciousness: awake and alert  Pain score: 0  Pain management: adequate  Airway patency: patent  Cardiovascular status: acceptable  Respiratory status: acceptable  Hydration status: acceptable  Postoperative Nausea and Vomiting: none  Comments: Epidural catheter removed by nursing. No redness, swelling, or drainage at puncture site.  Patient's back has slight red irritation from the tape.     Complete resolution of numbness. Patient is able to lift legs, bend at the knees, and ambulate.    Patient denies problems with urination.    Patient denies nausea, headache or severe back pain.           No notable events documented.    "

## 2024-06-12 NOTE — CARE PLAN
The patient's goals for the shift include  no falls    The clinical goals for the shift include vital signs wnl    Over the shift, the patient did met goals

## 2024-06-13 PROCEDURE — 2500000001 HC RX 250 WO HCPCS SELF ADMINISTERED DRUGS (ALT 637 FOR MEDICARE OP): Performed by: OBSTETRICS & GYNECOLOGY

## 2024-06-13 PROCEDURE — 1220000001 HC OB SEMI-PRIVATE ROOM DAILY

## 2024-06-13 ASSESSMENT — PAIN SCALES - GENERAL
PAINLEVEL_OUTOF10: 0 - NO PAIN

## 2024-06-13 NOTE — CARE PLAN
The patient's goals for the shift include bond with baby    The clinical goals for the shift include remain hemodynamically stable      Problem: Safety - Adult  Goal: Free from fall injury  Outcome: Progressing     Problem: Postpartum  Goal: Experiences normal postpartum course  Outcome: Progressing     Problem: Postpartum  Goal: Appropriate maternal -  bonding  Outcome: Progressing     Problem: Postpartum  Goal: Establish and maintain infant feeding pattern for adequate nutrition  Outcome: Progressing

## 2024-06-13 NOTE — PROGRESS NOTES
Postpartum Progress Note    Subjective   Feeling well. Denies current complaints or concerns.   Bottle feeding without difficulties. Denies breast tenderness or engorgement.   Occasional episodes of mild uterine cramping, which are not bothersome. Takes PO medications for effective relief. Plans to use OTC meds after discharge to home.   Denies unusual complaints or concerns with first degree perineal laceration.   Light lochia rubra, per pt report.   Ambulating without difficulties.   Eating and drinking well.   Voiding freely.   No other questions or concerns.     Objective   Temp:  [36.6 °C (97.9 °F)-37.1 °C (98.7 °F)] 36.9 °C (98.4 °F)  Heart Rate:  [82-98] 88  Resp:  [16-20] 18  BP: (112-134)/(72-88) 128/87   On review of BP's throughout hospital course, noted one mild range BP elevation (141/94) on 6/12 at 0311. All other BP's WNL.     General: Examination reveals a well developed, well nourished, female, in no acute distress and whose affect is appropriate. She is alert and cooperative.  Breasts: breasts appear normal for pregnancy, no suspicious masses, no skin or nipple changes or axillary nodes.  Fundus: firm and below umbilicus.  Perineum: well approximated and well healing.  Extremities: no redness or tenderness in the calves or thighs, no edema.  Psychological: awake and alert; oriented to person, place, and time.    Lab Results   Component Value Date    HGB 10.7 (L) 06/12/2024    HCT 33.3 (L) 06/12/2024       Assessment/Plan   Postpartum day 1.  Reviewed normal postpartum course, warning signs, breast care with bottle feeding, how/when to contact physician.   Denies current signs/symptoms of PP depression.   Reports having all necessary infant supplies at home.   Advised to continue PNV x at least 1 year postpartum. Has refills of PNV at home.   Discussed admit CBC results and continuation of oral iron (which pt took antenatally), if well tolerated.   Planning discharge to home tomorrow.   Follow-up in  office in 6 weeks, or sooner if medically necessary.   No other questions or concerns.    Principal Problem:    Normal labor ([facility])    Pregnancy Problems (from 11/21/23 to present)       Problem Noted Resolved    Polyhydramnios in third trimester ([facility]) 4/15/2024 by Sade Olivia MD No    Priority:  Medium      Overview Addendum 5/28/2024  9:47 AM by Preeti Xiong MD     - moderate polyhydramnios at 31 weeks  <> 36 week USN          Anemia affecting pregnancy in second trimester ([facility]) 3/15/2024 by NORA Woods-HAROLDO No    Priority:  Medium      Overview Addendum 5/28/2024  2:57 PM by Preeti Xiong MD     - last hgb 11.2 on 5/28  - on oral iron          Obesity in pregnancy ([facility]) 2/14/2024 by NORA Woods-HAROLDO No    Priority:  Medium      Overview Addendum 5/28/2024  9:47 AM by Preeti Xiong MD     - BMI 39 at first visit   - 31 week growth - EFW 41%  <> 36 week growth   <> weekly NSTs 36 weeks   <> IOL b/w 39.0-39.6            Supervision of other normal pregnancy, antepartum ([facility]) 11/21/2023 by Sade Olivia MD No    Priority:  Medium      Overview Addendum 5/28/2024  9:45 AM by Preeti Xiong MD     - Elevated glucola but normal 3 hour GTT

## 2024-06-13 NOTE — CARE PLAN
The patient's goals for the shift include bond with baby    The clinical goals for the shift include return to prepregnacy state

## 2024-06-14 VITALS
TEMPERATURE: 98.5 F | BODY MASS INDEX: 39.8 KG/M2 | OXYGEN SATURATION: 97 % | HEART RATE: 84 BPM | DIASTOLIC BLOOD PRESSURE: 82 MMHG | SYSTOLIC BLOOD PRESSURE: 120 MMHG | WEIGHT: 224.65 LBS | RESPIRATION RATE: 20 BRPM | HEIGHT: 63 IN

## 2024-06-14 PROCEDURE — 2500000001 HC RX 250 WO HCPCS SELF ADMINISTERED DRUGS (ALT 637 FOR MEDICARE OP): Performed by: OBSTETRICS & GYNECOLOGY

## 2024-06-14 RX ORDER — ACETAMINOPHEN 500 MG
1000 TABLET ORAL EVERY 6 HOURS PRN
COMMUNITY
Start: 2024-06-14

## 2024-06-14 RX ORDER — IBUPROFEN 200 MG
600 TABLET ORAL EVERY 6 HOURS PRN
COMMUNITY
Start: 2024-06-14

## 2024-06-14 ASSESSMENT — PAIN SCALES - GENERAL
PAINLEVEL_OUTOF10: 0 - NO PAIN

## 2024-06-14 NOTE — CARE PLAN
The patient's goals for the shift include rest, d/c    The clinical goals for the shift include No S/Sx of HDP, discharge       Problem: Pain - Adult  Goal: Verbalizes/displays adequate comfort level or baseline comfort level  Outcome: Met     Problem: Safety - Adult  Goal: Free from fall injury  Outcome: Met     Problem: Postpartum  Goal: Experiences normal postpartum course  Outcome: Met  Goal: Appropriate maternal -  bonding  Outcome: Met  Goal: Establish and maintain infant feeding pattern for adequate nutrition  Outcome: Met  Goal: Incisions, wounds, or drain sites healing without S/S of infection  Outcome: Met  Goal: No s/sx infection  Outcome: Met  Goal: No s/sx of hemorrhage  Outcome: Met  Goal: Minimal s/sx of HDP and BP<160/110  Outcome: Met     Problem: Discharge Planning  Goal: Discharge to home or other facility with appropriate resources  Outcome: Met     Problem: Chronic Conditions and Co-morbidities  Goal: Patient's chronic conditions and co-morbidity symptoms are monitored and maintained or improved  Outcome: Met

## 2024-06-14 NOTE — DISCHARGE INSTRUCTIONS

## 2024-06-14 NOTE — CARE PLAN
The patient's goals for the shift include bond with baby    The clinical goals for the shift include remain hemodynamically stable      Problem: Safety - Adult  Goal: Free from fall injury  Reactivated     Problem: Postpartum  Goal: Experiences normal postpartum course  Reactivated

## 2024-06-14 NOTE — DISCHARGE SUMMARY
Discharge Summary    Admission Date: 2024  Discharge Date: 2024     Discharge Diagnosis  Normal labor ([facility])    Hospital Course  Delivery Date: 2024  8:11 AM   Delivery type: Vaginal, Spontaneous    GA at delivery: 38w2d  Outcome: Living   Anesthesia during delivery: Epidural   Intrapartum complications: None   Feeding method: Breastfeeding Status: No     Procedures:    Contraception at discharge: none      Pertinent Physical Exam At Time of Discharge  General: Examination reveals a well developed, well nourished, female, in no acute distress. She is alert and cooperative.  Fundus: firm and below umbilicus.  Extremities: no redness or tenderness in the calves or thighs, no edema.  Psychological: awake and alert; oriented to person, place, and time.    Discharge Meds     Your medication list        START taking these medications        Instructions Last Dose Given Next Dose Due   acetaminophen 500 mg tablet  Commonly known as: Tylenol Extra Strength      Take 2 tablets (1,000 mg) by mouth every 6 hours if needed for mild pain (1 - 3).       ibuprofen 200 mg tablet  Commonly known as: Motrin IB      Take 3 tablets (600 mg) by mouth every 6 hours if needed for mild pain (1 - 3).              CONTINUE taking these medications        Instructions Last Dose Given Next Dose Due   docusate sodium 100 mg capsule  Commonly known as: Colace      Take 1 capsule (100 mg) by mouth 2 times a day.       famotidine 20 mg tablet  Commonly known as: Pepcid      Take 1 tablet (20 mg) by mouth once daily.       ferrous sulfate 325 (65 Fe) MG EC tablet      Take one tablet twice daily every other day. Do not crush, chew, or split.       prenatal vitamin (iron-folic) 27 mg iron-800 mcg folic acid tablet      Take 1 tablet by mouth once daily.                 Where to Get Your Medications        You can get these medications from any pharmacy    You don't need a prescription for these medications  acetaminophen 500 mg  tablet  ibuprofen 200 mg tablet          Complications Requiring Follow-Up  None    Test Results Pending At Discharge  Pending Labs       No current pending labs.            Outpatient Follow-Up  Future Appointments   Date Time Provider Department Center   6/18/2024 11:30 AM Sade Olivia MD [facility]   6/27/2024  1:40 PM Shelbie Medina DO [facility] spent 10 minutes in the professional and overall care of this patient.      Marshall White MD

## 2024-06-18 ENCOUNTER — APPOINTMENT (OUTPATIENT)
Dept: OBSTETRICS AND GYNECOLOGY | Facility: CLINIC | Age: 29
End: 2024-06-18
Payer: COMMERCIAL

## 2024-06-27 ENCOUNTER — APPOINTMENT (OUTPATIENT)
Dept: OBSTETRICS AND GYNECOLOGY | Facility: CLINIC | Age: 29
End: 2024-06-27
Payer: COMMERCIAL

## 2024-06-27 VITALS — BODY MASS INDEX: 35.96 KG/M2 | SYSTOLIC BLOOD PRESSURE: 130 MMHG | DIASTOLIC BLOOD PRESSURE: 91 MMHG | WEIGHT: 203 LBS

## 2024-06-27 PROBLEM — Z34.80 SUPERVISION OF OTHER NORMAL PREGNANCY, ANTEPARTUM (HHS-HCC): Status: RESOLVED | Noted: 2023-11-21 | Resolved: 2024-06-27

## 2024-06-27 PROBLEM — O99.210 OBESITY IN PREGNANCY (HHS-HCC): Status: RESOLVED | Noted: 2024-02-14 | Resolved: 2024-06-27

## 2024-06-27 PROBLEM — O99.012 ANEMIA AFFECTING PREGNANCY IN SECOND TRIMESTER (HHS-HCC): Status: RESOLVED | Noted: 2024-03-15 | Resolved: 2024-06-27

## 2024-06-27 PROBLEM — O40.3XX0 POLYHYDRAMNIOS IN THIRD TRIMESTER (HHS-HCC): Status: RESOLVED | Noted: 2024-04-15 | Resolved: 2024-06-27

## 2024-06-27 PROCEDURE — 0503F POSTPARTUM CARE VISIT: CPT | Performed by: OBSTETRICS & GYNECOLOGY

## 2024-06-27 RX ORDER — NORETHINDRONE ACETATE AND ETHINYL ESTRADIOL 1MG-20(24)
1 KIT ORAL DAILY
Qty: 84 TABLET | Refills: 3 | Status: SHIPPED | OUTPATIENT
Start: 2024-06-27 | End: 2025-06-27

## 2024-06-27 ASSESSMENT — EDINBURGH POSTNATAL DEPRESSION SCALE (EPDS)
I HAVE BEEN ABLE TO LAUGH AND SEE THE FUNNY SIDE OF THINGS: AS MUCH AS I ALWAYS COULD
I HAVE BEEN ANXIOUS OR WORRIED FOR NO GOOD REASON: NO, NOT AT ALL
I HAVE BLAMED MYSELF UNNECESSARILY WHEN THINGS WENT WRONG: YES, MOST OF THE TIME
TOTAL SCORE: 3
THE THOUGHT OF HARMING MYSELF HAS OCCURRED TO ME: NEVER
I HAVE BEEN SO UNHAPPY THAT I HAVE HAD DIFFICULTY SLEEPING: NOT AT ALL
I HAVE FELT SAD OR MISERABLE: NO, NOT AT ALL
I HAVE FELT SCARED OR PANICKY FOR NO GOOD REASON: NO, NOT AT ALL
I HAVE LOOKED FORWARD WITH ENJOYMENT TO THINGS: AS MUCH AS I EVER DID
THINGS HAVE BEEN GETTING ON TOP OF ME: NO, I HAVE BEEN COPING AS WELL AS EVER
I HAVE BEEN SO UNHAPPY THAT I HAVE BEEN CRYING: NO, NEVER

## 2024-06-27 NOTE — PROGRESS NOTES
Subjective   Patient ID: Paula Vasquez is a 29 y.o. female who presents for Postpartum Care (No concerns).  HPI  Pt presents for 2 wk PP visit.  Pt not having much bleeding.  Pt bottle feeding.      Review of Systems  all other symptoms were found to be neg except for HPI/CC.      Objective   Physical Exam  General  General Appearance - normal build and Well groomed, Not in acute distress, No acute respiratory distress.  Mental Status - Alert.    Integumentary  - - warm and dry with no rashes.    Head and Neck  - - normalocephalic.    Eye  - - Bilateral - pupils equal and round and sclera clear.    Chest and Lung Exam  - - Bilateral - normal breathing effort.    Musculoskeletal  - - normal posture and normal gait and station.      Assessment/Plan   Diagnoses and all orders for this visit:  Postpartum care and examination ([facility])     Talked to Pt about all birth control options.   Pt will start blisovi in 1-2 wks.  F/U at 6wks PP  Pt is to call with any questions of concerns.     Shelbie Medina DO 06/27/24 1:47 PM

## 2024-07-01 ENCOUNTER — APPOINTMENT (OUTPATIENT)
Dept: OBSTETRICS AND GYNECOLOGY | Facility: CLINIC | Age: 29
End: 2024-07-01
Payer: COMMERCIAL

## 2024-07-03 ENCOUNTER — APPOINTMENT (OUTPATIENT)
Dept: OBSTETRICS AND GYNECOLOGY | Facility: CLINIC | Age: 29
End: 2024-07-03
Payer: COMMERCIAL

## 2024-07-26 ENCOUNTER — APPOINTMENT (OUTPATIENT)
Dept: OBSTETRICS AND GYNECOLOGY | Facility: CLINIC | Age: 29
End: 2024-07-26
Payer: COMMERCIAL

## 2024-07-26 VITALS — SYSTOLIC BLOOD PRESSURE: 124 MMHG | BODY MASS INDEX: 36.46 KG/M2 | WEIGHT: 205.8 LBS | DIASTOLIC BLOOD PRESSURE: 77 MMHG

## 2024-07-26 ASSESSMENT — EDINBURGH POSTNATAL DEPRESSION SCALE (EPDS)
THINGS HAVE BEEN GETTING ON TOP OF ME: NO, MOST OF THE TIME I HAVE COPED QUITE WELL
I HAVE LOOKED FORWARD WITH ENJOYMENT TO THINGS: AS MUCH AS I EVER DID
I HAVE BEEN ANXIOUS OR WORRIED FOR NO GOOD REASON: NO, NOT AT ALL
TOTAL SCORE: 2
I HAVE FELT SAD OR MISERABLE: NO, NOT AT ALL
I HAVE BEEN ABLE TO LAUGH AND SEE THE FUNNY SIDE OF THINGS: AS MUCH AS I ALWAYS COULD
THE THOUGHT OF HARMING MYSELF HAS OCCURRED TO ME: NEVER
I HAVE BEEN SO UNHAPPY THAT I HAVE HAD DIFFICULTY SLEEPING: NOT AT ALL
I HAVE BEEN SO UNHAPPY THAT I HAVE BEEN CRYING: NO, NEVER
I HAVE BLAMED MYSELF UNNECESSARILY WHEN THINGS WENT WRONG: NOT VERY OFTEN
I HAVE FELT SCARED OR PANICKY FOR NO GOOD REASON: NO, NOT AT ALL

## 2024-07-26 NOTE — PROGRESS NOTES
Subjective   29 y.o.  presenting for postpartum follow-up after . 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     Delivery Date: 24  GA at Delivery: 38w2d  Type of Delivery:     Concerns: none    Pain: controlled  Lacerations: no pain, even with sex  Lochia: resolved. Started one light period.   Sexual Intimacy: Yes  Contraceptive Method: Condoms  Feeding Method: She is bottle feeding.   Lactation Consult Needed?: No    Bonding with Baby: yes  Mood:     Postpartum Depression: Low Risk  (2024)    [place]  Depression Scale     Last EPDS Total Score: 2     Last EPDS Self Harm Result: Never     OBJECTIVE:   General appearance: consistent with stated age, well groomed and cooperative  Integumentary: skin warm and dry without rash  Head and neck: normal cephalic and neck supple  Chest/lung Exam: normal breathing effort, no respiratory distress  Breast: deferred  Abdomen: soft, nontender and no hepatomegaly, splenomegaly, or mass. No significant diastasis appreciated.  Female genitourinary: vulva normal without rash or lesion - laceration healed  Peripheral vascular: no edema present    Assessment and Plan:  Pt is a 29 y.o. , normal postpartum recovery course  Adjusting well, discussed PMADs and when to contact office.  Bottle feeding.  Pap UTD and not indicated. Per [facility] notes, needs posppartum colposcopy - will proceed to schedule today.   Contraception discussed - wants to use combination OCPs. Rx provided at last visit - printed information provided.   Cleared to resume normal activity as desired - return to work letter given.   Advised to call office for breast complaints, abnormal bleeding, mood changes, or other concerning symptoms. Warning s/s discussed.    Follow up with colposcopy as discussed during prenatal course.   NORA Hill-HAROLDO

## 2024-08-06 ENCOUNTER — APPOINTMENT (OUTPATIENT)
Dept: OBSTETRICS AND GYNECOLOGY | Facility: CLINIC | Age: 29
End: 2024-08-06
Payer: COMMERCIAL

## 2024-08-06 VITALS
DIASTOLIC BLOOD PRESSURE: 82 MMHG | SYSTOLIC BLOOD PRESSURE: 116 MMHG | BODY MASS INDEX: 37.09 KG/M2 | WEIGHT: 209.38 LBS

## 2024-08-06 DIAGNOSIS — R87.612 LGSIL ON PAP SMEAR OF CERVIX: ICD-10-CM

## 2024-08-06 LAB — PREGNANCY TEST URINE, POC: NEGATIVE

## 2024-08-06 PROCEDURE — 57454 BX/CURETT OF CERVIX W/SCOPE: CPT | Performed by: ADVANCED PRACTICE MIDWIFE

## 2024-08-06 PROCEDURE — 81025 URINE PREGNANCY TEST: CPT | Performed by: ADVANCED PRACTICE MIDWIFE

## 2024-08-06 NOTE — PROGRESS NOTES
Patient ID: Paula Vasquez is a 29 y.o. female.    Colposcopy    Date/Time: 8/6/2024 9:21 AM    Performed by: MAYELA Chu  Authorized by: MAYELA Chu    Procedure location: cervix and vagina    Consent:     Patient questions answered: yes      Risks and benefits of the procedure and its alternatives discussed: yes      Procedural risks discussed:  Bleeding and infection    Consent obtained:  Verbal and written    Consent given by:  Patient  Indication:     Cervical indication(s): cervical LSIL    Pre-procedure:     Speculum was placed in the vagina: yes      Prep solution(s): acetic acid      Local anesthetic:  Benzocaine spray  Procedure:     Colposcopy with: cervical biopsy and endocervical curettage      Biopsy taken: yes      # of biopsies:  2    Biopsy location(s): 12 & 2 o'clock    Cervix visibility: fully visualized      SCJ visibility: fully visualized      Lesion visualized: fully visualized      Acetowhite lesion(s): cervix      Cervical impression: low grade      Vaginal impression: normal/benign      Ferric subsulfate solution applied: yes      Tampon inserted: no    Post-procedure:     Patient tolerance of procedure:  Patient tolerated the procedure well with no immediate complications    Instructions and paperwork completed: yes      Educational handouts given: yes

## 2024-08-13 LAB
LABORATORY COMMENT REPORT: NORMAL
PATH REPORT.FINAL DX SPEC: NORMAL
PATH REPORT.GROSS SPEC: NORMAL
PATH REPORT.RELEVANT HX SPEC: NORMAL
PATH REPORT.TOTAL CANCER: NORMAL

## 2025-08-28 ENCOUNTER — APPOINTMENT (OUTPATIENT)
Dept: OBSTETRICS AND GYNECOLOGY | Facility: CLINIC | Age: 30
End: 2025-08-28
Payer: COMMERCIAL

## 2025-08-28 VITALS — WEIGHT: 218.8 LBS | DIASTOLIC BLOOD PRESSURE: 81 MMHG | SYSTOLIC BLOOD PRESSURE: 120 MMHG | BODY MASS INDEX: 38.76 KG/M2

## 2025-08-28 DIAGNOSIS — O99.210 OBESITY AFFECTING PREGNANCY, ANTEPARTUM, UNSPECIFIED OBESITY TYPE (HHS-HCC): ICD-10-CM

## 2025-08-28 DIAGNOSIS — Z3A.01 7 WEEKS GESTATION OF PREGNANCY (HHS-HCC): Primary | ICD-10-CM

## 2025-08-28 DIAGNOSIS — K21.9 GASTROESOPHAGEAL REFLUX DISEASE WITHOUT ESOPHAGITIS: ICD-10-CM

## 2025-08-28 DIAGNOSIS — Z34.80 NORMAL PREGNANCY IN MULTIGRAVIDA (HHS-HCC): ICD-10-CM

## 2025-08-28 LAB — PREGNANCY TEST URINE, POC: POSITIVE

## 2025-08-28 RX ORDER — FAMOTIDINE 20 MG/1
20 TABLET, FILM COATED ORAL DAILY
Qty: 90 TABLET | Refills: 3 | Status: SHIPPED | OUTPATIENT
Start: 2025-08-28 | End: 2026-08-28

## 2025-08-28 ASSESSMENT — EDINBURGH POSTNATAL DEPRESSION SCALE (EPDS)
I HAVE FELT SAD OR MISERABLE: NOT VERY OFTEN
I HAVE BEEN ANXIOUS OR WORRIED FOR NO GOOD REASON: YES, SOMETIMES
I HAVE BEEN SO UNHAPPY THAT I HAVE HAD DIFFICULTY SLEEPING: NOT VERY OFTEN
TOTAL SCORE: 9
THINGS HAVE BEEN GETTING ON TOP OF ME: YES, SOMETIMES I HAVEN'T BEEN COPING AS WELL AS USUAL
I HAVE BLAMED MYSELF UNNECESSARILY WHEN THINGS WENT WRONG: YES, SOME OF THE TIME
I HAVE FELT SCARED OR PANICKY FOR NO GOOD REASON: NO, NOT MUCH
I HAVE LOOKED FORWARD WITH ENJOYMENT TO THINGS: AS MUCH AS I EVER DID
I HAVE BEEN ABLE TO LAUGH AND SEE THE FUNNY SIDE OF THINGS: AS MUCH AS I ALWAYS COULD
THE THOUGHT OF HARMING MYSELF HAS OCCURRED TO ME: NEVER
I HAVE BEEN SO UNHAPPY THAT I HAVE BEEN CRYING: NO, NEVER

## 2025-08-29 PROBLEM — B95.1 POSITIVE GBS TEST: Status: RESOLVED | Noted: 2024-05-31 | Resolved: 2025-08-29

## 2025-08-30 LAB
BACTERIA UR CULT: NORMAL
C TRACH RRNA SPEC QL NAA+PROBE: NOT DETECTED
N GONORRHOEA RRNA SPEC QL NAA+PROBE: NOT DETECTED
QUEST GC CT AMPLIFIED (ALWAYS MESSAGE): NORMAL
T VAGINALIS RRNA SPEC QL NAA+PROBE: NOT DETECTED

## 2025-09-25 ENCOUNTER — APPOINTMENT (OUTPATIENT)
Facility: CLINIC | Age: 30
End: 2025-09-25
Payer: COMMERCIAL

## 2025-10-24 ENCOUNTER — APPOINTMENT (OUTPATIENT)
Dept: OBSTETRICS AND GYNECOLOGY | Facility: CLINIC | Age: 30
End: 2025-10-24
Payer: COMMERCIAL

## 2025-11-21 ENCOUNTER — APPOINTMENT (OUTPATIENT)
Dept: OBSTETRICS AND GYNECOLOGY | Facility: CLINIC | Age: 30
End: 2025-11-21
Payer: COMMERCIAL

## (undated) DEVICE — GAUZE,SPONGE,4"X4",16PLY,XRAY,STRL,LF: Brand: MEDLINE

## (undated) DEVICE — TISSUE RETRIEVAL SYSTEM: Brand: INZII RETRIEVAL SYSTEM

## (undated) DEVICE — SUTURE ENDOLOOP SZ 0 L18IN ABSRB VLT LIG SLDE KNOT VCRL

## (undated) DEVICE — APPLICATOR MEDICATED 26 CC SOLUTION HI LT ORNG CHLORAPREP

## (undated) DEVICE — LABEL MED MINI W/ MARKER

## (undated) DEVICE — NEPTUNE E-SEP SMOKE EVACUATION PENCIL, COATED, 70MM BLADE, PUSH BUTTON SWITCH: Brand: NEPTUNE E-SEP

## (undated) DEVICE — SUTURE SZ 0 27IN 5/8 CIR UR-6  TAPER PT VIOLET ABSRB VICRYL J603H

## (undated) DEVICE — INTENDED FOR TISSUE SEPARATION, AND OTHER PROCEDURES THAT REQUIRE A SHARP SURGICAL BLADE TO PUNCTURE OR CUT.: Brand: BARD-PARKER ® CARBON RIB-BACK BLADES

## (undated) DEVICE — TUBING, SUCTION, 3/16" X 12', STRAIGHT: Brand: MEDLINE

## (undated) DEVICE — ADHESIVE SKIN CLSR 0.7ML TOP DERMBND ADV

## (undated) DEVICE — COUNTER NDL 40 COUNT HLD 70 FOAM BLK ADH W/ MAG

## (undated) DEVICE — TROCAR: Brand: KII® SLEEVE

## (undated) DEVICE — TUBING INSUFFLATION SMK EVAC HI FLO SET PNEUMOCLEAR

## (undated) DEVICE — SUTURE MCRYL SZ 4-0 L27IN ABSRB UD L19MM PS-2 1/2 CIR PRIM Y426H

## (undated) DEVICE — SINGLE PORT MANIFOLD: Brand: NEPTUNE 2

## (undated) DEVICE — GLOVE ORANGE PI 7 1/2   MSG9075

## (undated) DEVICE — KIT,ANTI FOG,W/SPONGE & FLUID,SOFT PACK: Brand: MEDLINE

## (undated) DEVICE — COVER LT HNDL BLU PLAS

## (undated) DEVICE — LAPAROSCOPIC TROCAR SLEEVE/SINGLE USE: Brand: KII® OPTICAL ACCESS SYSTEM

## (undated) DEVICE — TOWEL,OR,DSP,ST,BLUE,STD,4/PK,20PK/CS: Brand: MEDLINE

## (undated) DEVICE — Device

## (undated) DEVICE — GLOVE ORANGE PI 7   MSG9070

## (undated) DEVICE — APPLIER CLP M/L SHFT DIA5MM 15 LIG LIGAMAX 5

## (undated) DEVICE — PACK,BASIC: Brand: MEDLINE

## (undated) DEVICE — WARMER SCP 2 ANTIFOG LAP DISP

## (undated) DEVICE — GOWN,AURORA,NONREINFORCED,LARGE: Brand: MEDLINE

## (undated) DEVICE — DRAPE,LAP,CHOLE,W/TROUGHS,STERILE: Brand: MEDLINE

## (undated) DEVICE — ELECTRODE PT RET AD L9FT HI MOIST COND ADH HYDRGEL CORDED

## (undated) DEVICE — TROCARS: Brand: KII® BALLOON BLUNT TIP SYSTEM